# Patient Record
Sex: MALE | Race: WHITE | Employment: FULL TIME | URBAN - METROPOLITAN AREA
[De-identification: names, ages, dates, MRNs, and addresses within clinical notes are randomized per-mention and may not be internally consistent; named-entity substitution may affect disease eponyms.]

---

## 2017-01-11 ENCOUNTER — TELEPHONE (OUTPATIENT)
Dept: FAMILY MEDICINE CLINIC | Facility: CLINIC | Age: 57
End: 2017-01-11

## 2017-01-11 ENCOUNTER — MED REC SCAN ONLY (OUTPATIENT)
Dept: FAMILY MEDICINE CLINIC | Facility: CLINIC | Age: 57
End: 2017-01-11

## 2017-01-16 ENCOUNTER — CHARTING TRANS (OUTPATIENT)
Dept: ORTHOPEDICS | Age: 57
End: 2017-01-16

## 2017-01-16 ENCOUNTER — MYAURORA ACCOUNT LINK (OUTPATIENT)
Dept: OTHER | Age: 57
End: 2017-01-16

## 2017-01-18 ENCOUNTER — OFFICE VISIT (OUTPATIENT)
Dept: FAMILY MEDICINE CLINIC | Facility: CLINIC | Age: 57
End: 2017-01-18

## 2017-01-18 VITALS
TEMPERATURE: 97 F | RESPIRATION RATE: 16 BRPM | SYSTOLIC BLOOD PRESSURE: 120 MMHG | WEIGHT: 216.38 LBS | HEIGHT: 71.5 IN | BODY MASS INDEX: 29.63 KG/M2 | DIASTOLIC BLOOD PRESSURE: 82 MMHG | HEART RATE: 64 BPM

## 2017-01-18 DIAGNOSIS — F41.9 ANXIETY: ICD-10-CM

## 2017-01-18 DIAGNOSIS — Z00.00 WELL ADULT EXAM: Primary | ICD-10-CM

## 2017-01-18 DIAGNOSIS — E78.5 HYPERLIPIDEMIA, UNSPECIFIED HYPERLIPIDEMIA TYPE: ICD-10-CM

## 2017-01-18 DIAGNOSIS — E03.9 HYPOTHYROIDISM, UNSPECIFIED TYPE: ICD-10-CM

## 2017-01-18 LAB
ALBUMIN SERPL-MCNC: 4.2 G/DL (ref 3.5–4.8)
ALP LIVER SERPL-CCNC: 80 U/L (ref 45–117)
ALT SERPL-CCNC: 22 U/L (ref 17–63)
AST SERPL-CCNC: 16 U/L (ref 15–41)
BILIRUB SERPL-MCNC: 0.8 MG/DL (ref 0.1–2)
BUN BLD-MCNC: 12 MG/DL (ref 8–20)
CALCIUM BLD-MCNC: 9.1 MG/DL (ref 8.3–10.3)
CHLORIDE: 104 MMOL/L (ref 101–111)
CHOLEST SMN-MCNC: 176 MG/DL (ref ?–200)
CO2: 26 MMOL/L (ref 22–32)
COMPLEXED PSA SERPL-MCNC: 1.89 NG/ML (ref 0.01–4)
CREAT BLD-MCNC: 0.9 MG/DL (ref 0.7–1.3)
ERYTHROCYTE [DISTWIDTH] IN BLOOD BY AUTOMATED COUNT: 12.7 % (ref 11.5–16)
FREE T4: 1 NG/DL (ref 0.9–1.8)
GLUCOSE BLD-MCNC: 84 MG/DL (ref 70–99)
HCT VFR BLD AUTO: 49.5 % (ref 37–53)
HDLC SERPL-MCNC: 54 MG/DL (ref 45–?)
HDLC SERPL: 3.26 {RATIO} (ref ?–4.97)
HGB BLD-MCNC: 16.6 G/DL (ref 13–17)
LDLC SERPL CALC-MCNC: 105 MG/DL (ref ?–130)
M PROTEIN MFR SERPL ELPH: 7.2 G/DL (ref 6.1–8.3)
MCH RBC QN AUTO: 32.3 PG (ref 27–33.2)
MCHC RBC AUTO-ENTMCNC: 33.5 G/DL (ref 31–37)
MCV RBC AUTO: 96.3 FL (ref 80–99)
NONHDLC SERPL-MCNC: 122 MG/DL (ref ?–130)
PLATELET # BLD AUTO: 222 10(3)UL (ref 150–450)
POTASSIUM SERPL-SCNC: 4.2 MMOL/L (ref 3.6–5.1)
RBC # BLD AUTO: 5.14 X10(6)UL (ref 4.3–5.7)
RED CELL DISTRIBUTION WIDTH-SD: 45.1 FL (ref 35.1–46.3)
SODIUM SERPL-SCNC: 138 MMOL/L (ref 136–144)
TRIGLYCERIDES: 84 MG/DL (ref ?–150)
TSI SER-ACNC: 0.11 MIU/ML (ref 0.35–5.5)
VLDL: 17 MG/DL (ref 5–40)
WBC # BLD AUTO: 13.7 X10(3) UL (ref 4–13)

## 2017-01-18 PROCEDURE — 80050 GENERAL HEALTH PANEL: CPT | Performed by: FAMILY MEDICINE

## 2017-01-18 PROCEDURE — 80053 COMPREHEN METABOLIC PANEL: CPT | Performed by: FAMILY MEDICINE

## 2017-01-18 PROCEDURE — 84439 ASSAY OF FREE THYROXINE: CPT | Performed by: FAMILY MEDICINE

## 2017-01-18 PROCEDURE — 36415 COLL VENOUS BLD VENIPUNCTURE: CPT | Performed by: FAMILY MEDICINE

## 2017-01-18 PROCEDURE — 84443 ASSAY THYROID STIM HORMONE: CPT | Performed by: FAMILY MEDICINE

## 2017-01-18 PROCEDURE — 80061 LIPID PANEL: CPT | Performed by: FAMILY MEDICINE

## 2017-01-18 PROCEDURE — 99396 PREV VISIT EST AGE 40-64: CPT | Performed by: FAMILY MEDICINE

## 2017-01-18 PROCEDURE — 85027 COMPLETE CBC AUTOMATED: CPT | Performed by: FAMILY MEDICINE

## 2017-01-18 RX ORDER — ATORVASTATIN CALCIUM 20 MG/1
20 TABLET, FILM COATED ORAL NIGHTLY
Qty: 90 TABLET | Refills: 3 | Status: SHIPPED | OUTPATIENT
Start: 2017-01-18 | End: 2018-01-21

## 2017-01-18 RX ORDER — CITALOPRAM 20 MG/1
30 TABLET ORAL DAILY
Qty: 135 TABLET | Refills: 3 | Status: SHIPPED | OUTPATIENT
Start: 2017-01-18 | End: 2018-01-21

## 2017-01-18 NOTE — PROGRESS NOTES
Rebekah Fox is a 49year old male. CC:  Patient presents with:   Well Adult: per pt      HPI:  Yearly PX  Last lipid: 1 year ago  Last Tdap: 2009  Last PSA: 1 year  Last Colonoscopy: 2013--recommended repeat in 5 years due to hyperplastic polyps Denies dysuria, hematuria    Vitals: /82 mmHg  Pulse 64  Temp(Src) 97.3 °F (36.3 °C) (Temporal)  Resp 16  Ht 71.5\"  Wt 216 lb 6.4 oz  BMI 29.76 kg/m2   Reviewed by Louise Espinoza M.D.     Physical Exam:  GEN: well developed, well nourished, in no appare nightly. Citalopram Hydrobromide 20 MG Oral Tab 135 tablet 3      Sig: Take 1.5 tablets (30 mg total) by mouth daily. No Follow-up on file.                 Authorized by Geraldine Bonilla M.D.

## 2017-01-21 ENCOUNTER — TELEPHONE (OUTPATIENT)
Dept: FAMILY MEDICINE CLINIC | Facility: CLINIC | Age: 57
End: 2017-01-21

## 2017-01-23 PROBLEM — I45.10 RIGHT BUNDLE BRANCH BLOCK (RBBB): Status: ACTIVE | Noted: 2017-01-23

## 2017-01-23 PROBLEM — F32.A DEPRESSION: Status: ACTIVE | Noted: 2017-01-23

## 2017-02-18 ENCOUNTER — CHARTING TRANS (OUTPATIENT)
Dept: OTHER | Age: 57
End: 2017-02-18

## 2017-03-07 ENCOUNTER — NURSE ONLY (OUTPATIENT)
Dept: FAMILY MEDICINE CLINIC | Facility: CLINIC | Age: 57
End: 2017-03-07

## 2017-03-07 DIAGNOSIS — D72.829 LEUKOCYTOSIS, UNSPECIFIED TYPE: Primary | ICD-10-CM

## 2017-03-07 LAB
BASOPHILS # BLD AUTO: 0.06 X10(3) UL (ref 0–0.1)
BASOPHILS NFR BLD AUTO: 1.1 %
EOSINOPHIL # BLD AUTO: 0.08 X10(3) UL (ref 0–0.3)
EOSINOPHIL NFR BLD AUTO: 1.4 %
ERYTHROCYTE [DISTWIDTH] IN BLOOD BY AUTOMATED COUNT: 12.5 % (ref 11.5–16)
HCT VFR BLD AUTO: 46.8 % (ref 37–53)
HGB BLD-MCNC: 15.9 G/DL (ref 13–17)
IMMATURE GRANULOCYTE COUNT: 0.03 X10(3) UL (ref 0–1)
IMMATURE GRANULOCYTE RATIO %: 0.5 %
LYMPHOCYTES # BLD AUTO: 1.02 X10(3) UL (ref 0.9–4)
LYMPHOCYTES NFR BLD AUTO: 18.4 %
MCH RBC QN AUTO: 32.3 PG (ref 27–33.2)
MCHC RBC AUTO-ENTMCNC: 34 G/DL (ref 31–37)
MCV RBC AUTO: 94.9 FL (ref 80–99)
MONOCYTES # BLD AUTO: 0.98 X10(3) UL (ref 0.1–0.6)
MONOCYTES NFR BLD AUTO: 17.7 %
NEUTROPHIL ABS PRELIM: 3.37 X10 (3) UL (ref 1.3–6.7)
NEUTROPHILS # BLD AUTO: 3.37 X10(3) UL (ref 1.3–6.7)
NEUTROPHILS NFR BLD AUTO: 60.9 %
PLATELET # BLD AUTO: 164 10(3)UL (ref 150–450)
RBC # BLD AUTO: 4.93 X10(6)UL (ref 4.3–5.7)
RED CELL DISTRIBUTION WIDTH-SD: 43.5 FL (ref 35.1–46.3)
WBC # BLD AUTO: 5.5 X10(3) UL (ref 4–13)

## 2017-03-07 PROCEDURE — 85025 COMPLETE CBC W/AUTO DIFF WBC: CPT | Performed by: FAMILY MEDICINE

## 2017-03-07 PROCEDURE — 36415 COLL VENOUS BLD VENIPUNCTURE: CPT | Performed by: FAMILY MEDICINE

## 2017-04-12 ENCOUNTER — CHARTING TRANS (OUTPATIENT)
Dept: URGENT CARE | Age: 57
End: 2017-04-12

## 2017-04-12 ASSESSMENT — PAIN SCALES - GENERAL: PAINLEVEL_OUTOF10: 2

## 2017-04-19 ENCOUNTER — CHARTING TRANS (OUTPATIENT)
Dept: URGENT CARE | Age: 57
End: 2017-04-19

## 2017-04-19 ENCOUNTER — MYAURORA ACCOUNT LINK (OUTPATIENT)
Dept: OTHER | Age: 57
End: 2017-04-19

## 2017-04-19 ASSESSMENT — PAIN SCALES - GENERAL: PAINLEVEL_OUTOF10: 5

## 2017-06-07 ENCOUNTER — APPOINTMENT (OUTPATIENT)
Dept: LAB | Age: 57
End: 2017-06-07
Attending: FAMILY MEDICINE
Payer: COMMERCIAL

## 2017-06-07 ENCOUNTER — PATIENT MESSAGE (OUTPATIENT)
Dept: FAMILY MEDICINE CLINIC | Facility: CLINIC | Age: 57
End: 2017-06-07

## 2017-06-07 DIAGNOSIS — R53.83 OTHER FATIGUE: Primary | ICD-10-CM

## 2017-06-07 DIAGNOSIS — R53.83 OTHER FATIGUE: ICD-10-CM

## 2017-06-07 PROCEDURE — 36415 COLL VENOUS BLD VENIPUNCTURE: CPT | Performed by: FAMILY MEDICINE

## 2017-06-07 PROCEDURE — 84481 FREE ASSAY (FT-3): CPT | Performed by: FAMILY MEDICINE

## 2017-06-07 PROCEDURE — 84439 ASSAY OF FREE THYROXINE: CPT | Performed by: FAMILY MEDICINE

## 2017-06-07 PROCEDURE — 84443 ASSAY THYROID STIM HORMONE: CPT | Performed by: FAMILY MEDICINE

## 2017-06-07 NOTE — TELEPHONE ENCOUNTER
From: Lisa Werner  To: Aditya Corley MD  Sent: 6/7/2017 8:13 AM CDT  Subject: Non-Urgent Medical Question    I have noticed over the past couple of months my energy level feels below par. I also get very tired in the evening.  I'm checking to see if it

## 2017-07-10 ENCOUNTER — PATIENT MESSAGE (OUTPATIENT)
Dept: FAMILY MEDICINE CLINIC | Facility: CLINIC | Age: 57
End: 2017-07-10

## 2017-07-10 NOTE — TELEPHONE ENCOUNTER
From: Michelle Werner  To: Daniel Lindo MD  Sent: 7/10/2017 1:44 PM CDT  Subject: Non-Urgent Medical Question    Hello,  Crazy question for today. Robert Kowalski I have had dry and cracked lips for several months.  I've tried using several OTC products and they don't s

## 2017-07-27 ENCOUNTER — PATIENT MESSAGE (OUTPATIENT)
Dept: FAMILY MEDICINE CLINIC | Facility: CLINIC | Age: 57
End: 2017-07-27

## 2017-08-04 ENCOUNTER — PATIENT MESSAGE (OUTPATIENT)
Dept: FAMILY MEDICINE CLINIC | Facility: CLINIC | Age: 57
End: 2017-08-04

## 2017-08-04 NOTE — TELEPHONE ENCOUNTER
From: Jaime Dance Messmer  To: Jake Kate MD  Sent: 8/4/2017 1:29 PM CDT  Subject: Non-Urgent Medical Question    Doc Anuel Mcneil,  I'm over a week using the Lamisil on my lips and I'm not seeing/feeling any improvement. It's still very uncomfortable.  I'm also u

## 2017-09-27 ENCOUNTER — CHARTING TRANS (OUTPATIENT)
Dept: OTHER | Age: 57
End: 2017-09-27

## 2017-09-27 ENCOUNTER — MYAURORA ACCOUNT LINK (OUTPATIENT)
Dept: OTHER | Age: 57
End: 2017-09-27

## 2018-01-19 ENCOUNTER — HOSPITAL ENCOUNTER (OUTPATIENT)
Dept: GENERAL RADIOLOGY | Age: 58
Discharge: HOME | End: 2018-01-19
Attending: FAMILY MEDICINE
Payer: COMMERCIAL

## 2018-01-19 ENCOUNTER — OFFICE VISIT (OUTPATIENT)
Dept: FAMILY MEDICINE CLINIC | Facility: CLINIC | Age: 58
End: 2018-01-19

## 2018-01-19 ENCOUNTER — PATIENT MESSAGE (OUTPATIENT)
Dept: FAMILY MEDICINE CLINIC | Facility: CLINIC | Age: 58
End: 2018-01-19

## 2018-01-19 VITALS
SYSTOLIC BLOOD PRESSURE: 112 MMHG | WEIGHT: 223 LBS | RESPIRATION RATE: 16 BRPM | DIASTOLIC BLOOD PRESSURE: 76 MMHG | HEIGHT: 73 IN | HEART RATE: 64 BPM | TEMPERATURE: 97 F | BODY MASS INDEX: 29.55 KG/M2

## 2018-01-19 DIAGNOSIS — M25.50 MULTIPLE JOINT PAIN: ICD-10-CM

## 2018-01-19 DIAGNOSIS — F41.9 ANXIETY: ICD-10-CM

## 2018-01-19 DIAGNOSIS — E78.5 HYPERLIPIDEMIA, UNSPECIFIED HYPERLIPIDEMIA TYPE: ICD-10-CM

## 2018-01-19 DIAGNOSIS — R20.2 PARESTHESIA OF BOTH HANDS: ICD-10-CM

## 2018-01-19 DIAGNOSIS — E03.9 HYPOTHYROIDISM, UNSPECIFIED TYPE: ICD-10-CM

## 2018-01-19 DIAGNOSIS — Z00.00 WELL ADULT EXAM: Primary | ICD-10-CM

## 2018-01-19 LAB
25-HYDROXYVITAMIN D (TOTAL): 28.5 NG/ML (ref 30–100)
ALBUMIN SERPL-MCNC: 4.1 G/DL (ref 3.5–4.8)
ALP LIVER SERPL-CCNC: 82 U/L (ref 45–117)
ALT SERPL-CCNC: 39 U/L (ref 17–63)
AST SERPL-CCNC: 21 U/L (ref 15–41)
BILIRUB SERPL-MCNC: 0.9 MG/DL (ref 0.1–2)
BUN BLD-MCNC: 14 MG/DL (ref 8–20)
CALCIUM BLD-MCNC: 9.1 MG/DL (ref 8.3–10.3)
CHLORIDE: 106 MMOL/L (ref 101–111)
CHOLEST SMN-MCNC: 148 MG/DL (ref ?–200)
CO2: 30 MMOL/L (ref 22–32)
COMPLEXED PSA SERPL-MCNC: 1.58 NG/ML (ref 0.01–4)
CREAT BLD-MCNC: 0.89 MG/DL (ref 0.7–1.3)
ERYTHROCYTE [DISTWIDTH] IN BLOOD BY AUTOMATED COUNT: 12.5 % (ref 11.5–16)
FREE T4: 1 NG/DL (ref 0.9–1.8)
GLUCOSE BLD-MCNC: 73 MG/DL (ref 70–99)
HCT VFR BLD AUTO: 46.8 % (ref 37–53)
HDLC SERPL-MCNC: 44 MG/DL (ref 45–?)
HDLC SERPL: 3.36 {RATIO} (ref ?–4.97)
HGB BLD-MCNC: 16.1 G/DL (ref 13–17)
LDLC SERPL CALC-MCNC: 84 MG/DL (ref ?–130)
M PROTEIN MFR SERPL ELPH: 7.2 G/DL (ref 6.1–8.3)
MCH RBC QN AUTO: 31.4 PG (ref 27–33.2)
MCHC RBC AUTO-ENTMCNC: 34.4 G/DL (ref 31–37)
MCV RBC AUTO: 91.4 FL (ref 80–99)
NONHDLC SERPL-MCNC: 104 MG/DL (ref ?–130)
PLATELET # BLD AUTO: 231 10(3)UL (ref 150–450)
POTASSIUM SERPL-SCNC: 4.6 MMOL/L (ref 3.6–5.1)
RBC # BLD AUTO: 5.12 X10(6)UL (ref 4.3–5.7)
RED CELL DISTRIBUTION WIDTH-SD: 42 FL (ref 35.1–46.3)
RHEUMATOID FACT SERPL-ACNC: 11 IU/ML (ref ?–15)
SODIUM SERPL-SCNC: 139 MMOL/L (ref 136–144)
TRIGL SERPL-MCNC: 98 MG/DL (ref ?–150)
TSI SER-ACNC: 0.41 MIU/ML (ref 0.35–5.5)
VLDLC SERPL CALC-MCNC: 20 MG/DL (ref 5–40)
WBC # BLD AUTO: 6.5 X10(3) UL (ref 4–13)

## 2018-01-19 PROCEDURE — 84153 ASSAY OF PSA TOTAL: CPT | Performed by: FAMILY MEDICINE

## 2018-01-19 PROCEDURE — 99396 PREV VISIT EST AGE 40-64: CPT | Performed by: FAMILY MEDICINE

## 2018-01-19 PROCEDURE — 86431 RHEUMATOID FACTOR QUANT: CPT | Performed by: FAMILY MEDICINE

## 2018-01-19 PROCEDURE — 86225 DNA ANTIBODY NATIVE: CPT | Performed by: FAMILY MEDICINE

## 2018-01-19 PROCEDURE — 72050 X-RAY EXAM NECK SPINE 4/5VWS: CPT | Performed by: FAMILY MEDICINE

## 2018-01-19 PROCEDURE — 82306 VITAMIN D 25 HYDROXY: CPT | Performed by: FAMILY MEDICINE

## 2018-01-19 PROCEDURE — 86038 ANTINUCLEAR ANTIBODIES: CPT | Performed by: FAMILY MEDICINE

## 2018-01-19 PROCEDURE — 36415 COLL VENOUS BLD VENIPUNCTURE: CPT | Performed by: FAMILY MEDICINE

## 2018-01-19 PROCEDURE — 86235 NUCLEAR ANTIGEN ANTIBODY: CPT | Performed by: FAMILY MEDICINE

## 2018-01-19 PROCEDURE — 80061 LIPID PANEL: CPT | Performed by: FAMILY MEDICINE

## 2018-01-19 PROCEDURE — 80050 GENERAL HEALTH PANEL: CPT | Performed by: FAMILY MEDICINE

## 2018-01-19 PROCEDURE — 84439 ASSAY OF FREE THYROXINE: CPT | Performed by: FAMILY MEDICINE

## 2018-01-19 NOTE — PROGRESS NOTES
Dahlia Yepez is a 56year old male.     CC:  Patient presents with:  Physical: per pt      HPI:  Yearly PX  Last lipid: 1 year ago  Last Tdap: 2009  Last PSA: 1 year  Last Colonoscopy: 2013--recommended repeat in 5 years due to hyperplastic polyps     H Noted on EKG from [facility] note dated 4/27/04      Past Surgical History:  No date: OTHER SURGICAL HISTORY      Comment: Retinal detatchment  No date: VASECTOMY      Comment: From [facility] records   Family History   Problem Relation Age of Onset   • CAD [Other Future  - LIPID PANEL; Future  - VITAMIN D, 25-HYDROXY; Future  - PSA SCREEN; Future    2. Hyperlipidemia, unspecified hyperlipidemia type  Await results     - COMP METABOLIC PANEL (14); Future  - LIPID PANEL; Future    3.  Hypothyroidism, unspecified type

## 2018-01-20 NOTE — TELEPHONE ENCOUNTER
LOV  01/19/2018  Last refill   Levothyroxine: 02/17/2017  #90 w. 3RF  Atorvastatin 01/18/2017 #90 w. 3RF  Citalopram 01/18/2017 #90 w. 3  No future appointments. Please advise.

## 2018-01-20 NOTE — TELEPHONE ENCOUNTER
From: León Werner  To: Brian Schmitz MD  Sent: 1/19/2018 6:31 PM CST  Subject: Other    Please note that my maintenance drug scripts should be sent to [place] [id number]. Please do not send these to [facility] due to insurance purposes.   Let me know if you ha

## 2018-01-21 RX ORDER — LEVOTHYROXINE SODIUM 0.1 MG/1
100 TABLET ORAL DAILY
Qty: 90 TABLET | Refills: 3 | Status: SHIPPED | OUTPATIENT
Start: 2018-01-21 | End: 2019-01-21

## 2018-01-21 RX ORDER — CITALOPRAM 20 MG/1
30 TABLET ORAL DAILY
Qty: 135 TABLET | Refills: 3 | Status: SHIPPED | OUTPATIENT
Start: 2018-01-21 | End: 2018-01-29 | Stop reason: DRUGHIGH

## 2018-01-21 RX ORDER — ATORVASTATIN CALCIUM 20 MG/1
20 TABLET, FILM COATED ORAL NIGHTLY
Qty: 90 TABLET | Refills: 3 | Status: SHIPPED | OUTPATIENT
Start: 2018-01-21 | End: 2019-05-27

## 2018-01-24 LAB
ANA SCREEN: POSITIVE
CENTROMERE AUTOAB: <100 AU/ML (ref ?–100)
DSDNA AUTOAB: 121 IU/ML (ref ?–100)
HISTONE AUTOAB: <100 AU/ML (ref ?–100)
JO-1 AUTOAB: <100 AU/ML (ref ?–100)
RNP AUTOAB: 175 AU/ML (ref ?–100)
SCL-70 AUTOAB: 158 AU/ML (ref ?–100)
SM AUTOAB (SMITH): <100 AU/ML (ref ?–100)
SSA AUTOAB: 141 AU/ML (ref ?–100)
SSB AUTOAB: <100 AU/ML (ref ?–100)

## 2018-01-25 PROBLEM — R76.8 POSITIVE ANA (ANTINUCLEAR ANTIBODY): Status: ACTIVE | Noted: 2018-01-25

## 2018-01-26 ENCOUNTER — MYAURORA ACCOUNT LINK (OUTPATIENT)
Dept: OTHER | Age: 58
End: 2018-01-26

## 2018-02-09 ENCOUNTER — PATIENT MESSAGE (OUTPATIENT)
Dept: FAMILY MEDICINE CLINIC | Facility: CLINIC | Age: 58
End: 2018-02-09

## 2018-02-10 NOTE — TELEPHONE ENCOUNTER
From: Jaime Dance Messmer  To: Jake Kate MD  Sent: 2/9/2018 12:17 PM CST  Subject: Test Results Question    Can you please email me or mail me the lab results from my BENITA Lupas test?  I need to bring these with me to the appointment. Thank you.

## 2018-02-22 RX ORDER — ATORVASTATIN CALCIUM 20 MG/1
20 TABLET, FILM COATED ORAL NIGHTLY
Qty: 90 TABLET | Refills: 3 | Status: SHIPPED | OUTPATIENT
Start: 2018-02-22 | End: 2018-08-31

## 2018-02-22 NOTE — TELEPHONE ENCOUNTER
Last refill on Atorvastatin #90 with 3 refills on 1 21 2018   Last Lipid Panel on 1 19 2018   Last OV on 1 19 2018

## 2018-03-14 ENCOUNTER — IMAGING SERVICES (OUTPATIENT)
Dept: OTHER | Age: 58
End: 2018-03-14

## 2018-03-20 ENCOUNTER — CHARTING TRANS (OUTPATIENT)
Dept: OTHER | Age: 58
End: 2018-03-20

## 2018-04-16 PROCEDURE — 86225 DNA ANTIBODY NATIVE: CPT | Performed by: INTERNAL MEDICINE

## 2018-04-16 PROCEDURE — 84165 PROTEIN E-PHORESIS SERUM: CPT | Performed by: INTERNAL MEDICINE

## 2018-04-16 PROCEDURE — 83883 ASSAY NEPHELOMETRY NOT SPEC: CPT | Performed by: INTERNAL MEDICINE

## 2018-04-16 PROCEDURE — 86334 IMMUNOFIX E-PHORESIS SERUM: CPT | Performed by: INTERNAL MEDICINE

## 2018-04-16 PROCEDURE — 86160 COMPLEMENT ANTIGEN: CPT | Performed by: INTERNAL MEDICINE

## 2018-04-16 PROCEDURE — 86200 CCP ANTIBODY: CPT | Performed by: INTERNAL MEDICINE

## 2018-04-16 PROCEDURE — 86038 ANTINUCLEAR ANTIBODIES: CPT | Performed by: INTERNAL MEDICINE

## 2018-04-16 PROCEDURE — 83516 IMMUNOASSAY NONANTIBODY: CPT | Performed by: INTERNAL MEDICINE

## 2018-05-04 ENCOUNTER — LAB ENCOUNTER (OUTPATIENT)
Dept: LAB | Age: 58
End: 2018-05-04
Attending: INTERNAL MEDICINE
Payer: COMMERCIAL

## 2018-05-04 DIAGNOSIS — R76.8 POSITIVE ANA (ANTINUCLEAR ANTIBODY): ICD-10-CM

## 2018-05-04 DIAGNOSIS — M25.60 MORNING STIFFNESS OF JOINTS: ICD-10-CM

## 2018-05-04 DIAGNOSIS — M25.50 POLYARTHRALGIA: ICD-10-CM

## 2018-05-04 DIAGNOSIS — R68.2 DRY MOUTH: ICD-10-CM

## 2018-05-04 PROCEDURE — 86812 HLA TYPING A B OR C: CPT

## 2018-05-04 PROCEDURE — 36415 COLL VENOUS BLD VENIPUNCTURE: CPT

## 2018-05-07 NOTE — PROGRESS NOTES
Call pt  [id number] that sometimes can be seen in psoriatic arthritis is negative  I would still advise US of hands as next step  Order is placed

## 2018-05-16 ENCOUNTER — CHARTING TRANS (OUTPATIENT)
Dept: OTHER | Age: 58
End: 2018-05-16

## 2018-08-31 PROCEDURE — 80074 ACUTE HEPATITIS PANEL: CPT | Performed by: INTERNAL MEDICINE

## 2018-08-31 PROCEDURE — 86480 TB TEST CELL IMMUN MEASURE: CPT | Performed by: INTERNAL MEDICINE

## 2018-08-31 PROCEDURE — 86235 NUCLEAR ANTIGEN ANTIBODY: CPT | Performed by: INTERNAL MEDICINE

## 2018-08-31 PROCEDURE — 86160 COMPLEMENT ANTIGEN: CPT | Performed by: INTERNAL MEDICINE

## 2018-08-31 PROCEDURE — 86038 ANTINUCLEAR ANTIBODIES: CPT | Performed by: INTERNAL MEDICINE

## 2018-09-17 ENCOUNTER — CHARTING TRANS (OUTPATIENT)
Dept: OTHER | Age: 58
End: 2018-09-17

## 2018-10-29 PROCEDURE — 81003 URINALYSIS AUTO W/O SCOPE: CPT | Performed by: INTERNAL MEDICINE

## 2018-11-28 ENCOUNTER — CHARTING TRANS (OUTPATIENT)
Dept: OTHER | Age: 58
End: 2018-11-28

## 2018-11-28 VITALS
SYSTOLIC BLOOD PRESSURE: 123 MMHG | RESPIRATION RATE: 16 BRPM | HEART RATE: 64 BPM | TEMPERATURE: 97.8 F | DIASTOLIC BLOOD PRESSURE: 73 MMHG | OXYGEN SATURATION: 98 %

## 2018-11-28 VITALS
DIASTOLIC BLOOD PRESSURE: 66 MMHG | OXYGEN SATURATION: 98 % | HEART RATE: 70 BPM | RESPIRATION RATE: 18 BRPM | SYSTOLIC BLOOD PRESSURE: 108 MMHG | TEMPERATURE: 97.8 F

## 2018-11-29 ENCOUNTER — MYAURORA ACCOUNT LINK (OUTPATIENT)
Dept: OTHER | Age: 58
End: 2018-11-29

## 2018-11-29 ENCOUNTER — CHARTING TRANS (OUTPATIENT)
Dept: OTHER | Age: 58
End: 2018-11-29

## 2018-11-29 VITALS — HEIGHT: 73 IN | BODY MASS INDEX: 27.7 KG/M2 | WEIGHT: 209 LBS

## 2018-12-03 LAB — AP REPORT: NORMAL

## 2018-12-20 PROCEDURE — 81003 URINALYSIS AUTO W/O SCOPE: CPT | Performed by: INTERNAL MEDICINE

## 2018-12-20 PROCEDURE — 86160 COMPLEMENT ANTIGEN: CPT | Performed by: INTERNAL MEDICINE

## 2019-01-01 ENCOUNTER — EXTERNAL RECORD (OUTPATIENT)
Dept: HEALTH INFORMATION MANAGEMENT | Facility: OTHER | Age: 59
End: 2019-01-01

## 2019-01-02 ENCOUNTER — E-ADVICE (OUTPATIENT)
Dept: GASTROENTEROLOGY | Age: 59
End: 2019-01-02

## 2019-01-08 ENCOUNTER — TELEPHONE (OUTPATIENT)
Dept: FAMILY MEDICINE CLINIC | Facility: CLINIC | Age: 59
End: 2019-01-08

## 2019-01-21 RX ORDER — LEVOTHYROXINE SODIUM 0.1 MG/1
100 TABLET ORAL DAILY
Qty: 90 TABLET | Refills: 1 | Status: SHIPPED | OUTPATIENT
Start: 2019-01-21 | End: 2019-01-24 | Stop reason: DRUGHIGH

## 2019-01-21 RX ORDER — CITALOPRAM 40 MG/1
40 TABLET ORAL DAILY
Qty: 90 TABLET | Refills: 1 | Status: SHIPPED | OUTPATIENT
Start: 2019-01-21 | End: 2019-01-23 | Stop reason: ALTCHOICE

## 2019-01-21 NOTE — TELEPHONE ENCOUNTER
Levothyroxine last refilled on 1/21/18 for # 90 with 3 rf. Citalopram last refilled on 1/29/18 for # 90 with 3 rf. Last labs 1/19/18. Last seen on 1/19/18.      Future Appointments   Date Time Provider Pierre Dukes   1/23/2019  8:00 AM Juju Hale

## 2019-01-23 ENCOUNTER — OFFICE VISIT (OUTPATIENT)
Dept: FAMILY MEDICINE CLINIC | Facility: CLINIC | Age: 59
End: 2019-01-23
Payer: COMMERCIAL

## 2019-01-23 VITALS
HEIGHT: 71.5 IN | WEIGHT: 226.19 LBS | SYSTOLIC BLOOD PRESSURE: 100 MMHG | TEMPERATURE: 97 F | DIASTOLIC BLOOD PRESSURE: 74 MMHG | HEART RATE: 66 BPM | RESPIRATION RATE: 14 BRPM | BODY MASS INDEX: 30.97 KG/M2

## 2019-01-23 DIAGNOSIS — M19.90 INFLAMMATORY ARTHRITIS: ICD-10-CM

## 2019-01-23 DIAGNOSIS — Z00.00 WELL ADULT EXAM: Primary | ICD-10-CM

## 2019-01-23 DIAGNOSIS — E78.5 HYPERLIPIDEMIA, UNSPECIFIED HYPERLIPIDEMIA TYPE: ICD-10-CM

## 2019-01-23 DIAGNOSIS — F41.9 ANXIETY: ICD-10-CM

## 2019-01-23 DIAGNOSIS — R35.0 URINARY FREQUENCY: ICD-10-CM

## 2019-01-23 DIAGNOSIS — E55.9 VITAMIN D DEFICIENCY: ICD-10-CM

## 2019-01-23 DIAGNOSIS — E03.9 HYPOTHYROIDISM, UNSPECIFIED TYPE: ICD-10-CM

## 2019-01-23 LAB
CHOLEST SMN-MCNC: 164 MG/DL (ref ?–200)
COMPLEXED PSA SERPL-MCNC: 2.04 NG/ML (ref 0.01–4)
HDLC SERPL-MCNC: 44 MG/DL (ref 40–59)
LDLC SERPL CALC-MCNC: 101 MG/DL (ref ?–100)
NONHDLC SERPL-MCNC: 120 MG/DL (ref ?–130)
T3FREE SERPL-MCNC: 2.35 PG/ML (ref 2.3–4.2)
T4 FREE SERPL-MCNC: 0.8 NG/DL (ref 0.9–1.8)
TRIGL SERPL-MCNC: 93 MG/DL (ref 30–149)
TSI SER-ACNC: 14.3 MIU/ML (ref 0.35–5.5)
VIT D+METAB SERPL-MCNC: 49.3 NG/ML (ref 30–100)
VLDLC SERPL CALC-MCNC: 19 MG/DL (ref 0–30)

## 2019-01-23 PROCEDURE — 99396 PREV VISIT EST AGE 40-64: CPT | Performed by: FAMILY MEDICINE

## 2019-01-23 PROCEDURE — 84443 ASSAY THYROID STIM HORMONE: CPT | Performed by: FAMILY MEDICINE

## 2019-01-23 PROCEDURE — 84481 FREE ASSAY (FT-3): CPT | Performed by: FAMILY MEDICINE

## 2019-01-23 PROCEDURE — 80061 LIPID PANEL: CPT | Performed by: FAMILY MEDICINE

## 2019-01-23 PROCEDURE — 84439 ASSAY OF FREE THYROXINE: CPT | Performed by: FAMILY MEDICINE

## 2019-01-23 PROCEDURE — 84153 ASSAY OF PSA TOTAL: CPT | Performed by: FAMILY MEDICINE

## 2019-01-23 PROCEDURE — 82306 VITAMIN D 25 HYDROXY: CPT | Performed by: FAMILY MEDICINE

## 2019-01-23 PROCEDURE — 90715 TDAP VACCINE 7 YRS/> IM: CPT | Performed by: FAMILY MEDICINE

## 2019-01-23 PROCEDURE — 36415 COLL VENOUS BLD VENIPUNCTURE: CPT | Performed by: FAMILY MEDICINE

## 2019-01-23 PROCEDURE — 90471 IMMUNIZATION ADMIN: CPT | Performed by: FAMILY MEDICINE

## 2019-01-23 RX ORDER — SULFASALAZINE 500 MG/1
TABLET, DELAYED RELEASE ORAL
COMMUNITY
Start: 2019-01-23 | End: 2019-02-19

## 2019-01-23 RX ORDER — CITALOPRAM 40 MG/1
40 TABLET ORAL DAILY
Qty: 90 TABLET | Refills: 1 | COMMUNITY
Start: 2019-01-23 | End: 2019-01-23

## 2019-01-23 RX ORDER — DULOXETIN HYDROCHLORIDE 30 MG/1
30 CAPSULE, DELAYED RELEASE ORAL DAILY
Qty: 90 CAPSULE | Refills: 0 | Status: SHIPPED | OUTPATIENT
Start: 2019-01-23 | End: 2019-01-23

## 2019-01-23 NOTE — PROGRESS NOTES
Dahlia Yepez is a 66year old male.     CC:  Patient presents with:  Physical      HPI:  Yearly PX  Last lipid:  Component      Latest Ref Rng & Units 1/19/2018   CHOLESTEROL, TOTAL      <200 mg/dL 148   Triglycerides      <150 mg/dL 98   HDL Cholestero LEVOTHYROXINE SODIUM 100 MCG Oral Tab TAKE 1 TABLET (100 MCG TOTAL) BY MOUTH DAILY. Disp: 90 tablet Rfl: 1   atorvastatin 20 MG Oral Tab Take 1 tablet (20 mg total) by mouth nightly.  Disp: 90 tablet Rfl: 3   Halobetasol Propionate 0.05 % External Ointmen apparent distress  EYE: B conjunctiva and lids normal  HENT: normocephalic; normal nose, pharynx and TM's  NECK: No lymphadenopathy, thyromegaly or masses  CAR: S1, S2 normal, RRR; no S3, no S4; no click; murmur negative  PULM: clear to auscultation B, no This Encounter      Lipid Panel [E]      TSH and Free T4 [E]      Triiodothyronine,Free,Serum [E]      PSA (Screening) [E]      Vitamin D, 25-Hydroxy [E]      *Venipuncture      Orders Placed This Encounter      Lipid Panel [E]          Standing Status:  Fu

## 2019-01-24 ENCOUNTER — TELEPHONE (OUTPATIENT)
Dept: FAMILY MEDICINE CLINIC | Facility: CLINIC | Age: 59
End: 2019-01-24

## 2019-01-24 RX ORDER — LEVOTHYROXINE SODIUM 0.12 MG/1
125 TABLET ORAL
Qty: 90 TABLET | Refills: 0 | Status: SHIPPED | OUTPATIENT
Start: 2019-01-24 | End: 2019-04-18

## 2019-01-24 NOTE — TELEPHONE ENCOUNTER
Patient advised of the blood work results and recommendations per Dr. Yvrose Garber. Patient states that he takes his thyroid medication between 3 and 6 am. Does not take his other medication with the thyroid medication.    Patient is going to increase his synt

## 2019-01-24 NOTE — TELEPHONE ENCOUNTER
----- Message from Aubrey Guo MD sent at 1/23/2019  6:00 PM CST -----  Please let patient know that the Vit D, sugar, electrolyte, liver, kidney, and prostate, tests were fine. There is no anemia and the white blood cell count is fine.  Lipids are fine

## 2019-01-24 NOTE — TELEPHONE ENCOUNTER
----- Message from Michelle Jack MD sent at 1/23/2019  6:00 PM CST -----  Please let patient know that the Vit D, sugar, electrolyte, liver, kidney, and prostate, tests were fine. There is no anemia and the white blood cell count is fine.  Lipids are fine

## 2019-02-14 PROCEDURE — 81003 URINALYSIS AUTO W/O SCOPE: CPT | Performed by: INTERNAL MEDICINE

## 2019-03-06 VITALS
HEIGHT: 73 IN | DIASTOLIC BLOOD PRESSURE: 82 MMHG | SYSTOLIC BLOOD PRESSURE: 114 MMHG | WEIGHT: 225 LBS | HEART RATE: 64 BPM | BODY MASS INDEX: 29.82 KG/M2

## 2019-03-13 ENCOUNTER — OFFICE VISIT (OUTPATIENT)
Dept: FAMILY MEDICINE CLINIC | Facility: CLINIC | Age: 59
End: 2019-03-13
Payer: COMMERCIAL

## 2019-03-13 VITALS
SYSTOLIC BLOOD PRESSURE: 120 MMHG | TEMPERATURE: 97 F | BODY MASS INDEX: 31 KG/M2 | WEIGHT: 222 LBS | OXYGEN SATURATION: 98 % | HEART RATE: 78 BPM | DIASTOLIC BLOOD PRESSURE: 78 MMHG

## 2019-03-13 DIAGNOSIS — H93.11 TINNITUS OF RIGHT EAR: ICD-10-CM

## 2019-03-13 DIAGNOSIS — E03.9 HYPOTHYROIDISM, UNSPECIFIED TYPE: Primary | ICD-10-CM

## 2019-03-13 LAB
T4 FREE SERPL-MCNC: 1 NG/DL (ref 0.8–1.7)
TSI SER-ACNC: 0.44 MIU/ML (ref 0.36–3.74)

## 2019-03-13 PROCEDURE — 36415 COLL VENOUS BLD VENIPUNCTURE: CPT | Performed by: FAMILY MEDICINE

## 2019-03-13 PROCEDURE — 99214 OFFICE O/P EST MOD 30 MIN: CPT | Performed by: FAMILY MEDICINE

## 2019-03-13 PROCEDURE — 84439 ASSAY OF FREE THYROXINE: CPT | Performed by: FAMILY MEDICINE

## 2019-03-13 PROCEDURE — 84443 ASSAY THYROID STIM HORMONE: CPT | Performed by: FAMILY MEDICINE

## 2019-03-13 NOTE — PROGRESS NOTES
Kelsea Phillip is a 66year old male.     CC:  Patient presents with:  Medication Follow-Up: per pt      HPI:  Follow up hypothyroid:  Energy: good  Skin changes: no  Palpitations: yes, a rare palpitation with no associated symptoms  Weight changes:  yes, dated 4/27/04      Past Surgical History:   Procedure Laterality Date   • OTHER SURGICAL HISTORY      Retinal detatchment   • VASECTOMY      From [facility] records      Family History   Problem Relation Age of Onset   • Other (CAD [Other]) Father       [facility]   Tel: [phone]       Orders for this visit:  Orders Placed This Encounter      TSH and Free T4 [E]      Orders Placed This Encounter      TSH and Free T4 [E]          Standing Status: Future          Number of Occurrences: 1          Standi

## 2019-03-21 PROCEDURE — 81003 URINALYSIS AUTO W/O SCOPE: CPT | Performed by: INTERNAL MEDICINE

## 2019-04-18 RX ORDER — LEVOTHYROXINE SODIUM 0.12 MG/1
125 TABLET ORAL
Qty: 90 TABLET | Refills: 1 | Status: SHIPPED | OUTPATIENT
Start: 2019-04-18 | End: 2019-05-04 | Stop reason: DRUGHIGH

## 2019-04-18 NOTE — TELEPHONE ENCOUNTER
Last refill on Levothyroxine #90 with 0 refills on 1 24 2019   Last OV on 3 13 2019   Last TSH on 3 13 2019  TSH- 0.436  Free T4- 1.0

## 2019-05-03 PROCEDURE — 36415 COLL VENOUS BLD VENIPUNCTURE: CPT | Performed by: FAMILY MEDICINE

## 2019-05-03 PROCEDURE — 84439 ASSAY OF FREE THYROXINE: CPT | Performed by: FAMILY MEDICINE

## 2019-05-03 PROCEDURE — 84443 ASSAY THYROID STIM HORMONE: CPT | Performed by: FAMILY MEDICINE

## 2019-05-03 NOTE — PROGRESS NOTES
Brandon Rodriguez is a 64year old male. CC:  Patient presents with:  Medication Follow-Up: per pt      HPI:  Increased anxiety due issues pertaining to arthritis and some life stresses. He is not sleeping well and worries more.     He is due for repeat t From [facility] records   • Right bundle branch block (RBBB) 1/23/2017    Noted on EKG from [facility] note dated 4/27/04      Past Surgical History:   Procedure Laterality Date   • OTHER SURGICAL HISTORY      Retinal detatchment   • VASECTOMY      From [facility] Future    4. Tinnitus aurium, right  Await results   - MRI IACS (W+WO) (CPT=70713); Future    5. Vertigo  Await results   - MRI IACS (W+WO) (CPT=70713);  Future      Orders for this visit:  Orders Placed This Encounter      TSH and Free T4 [E]      Orders P

## 2019-05-08 ENCOUNTER — TELEPHONE (OUTPATIENT)
Dept: FAMILY MEDICINE CLINIC | Facility: CLINIC | Age: 59
End: 2019-05-08

## 2019-05-08 NOTE — TELEPHONE ENCOUNTER
Patient advised that the MRI has been approved.  Patient advised of the phone number to call and schedule the exam.

## 2019-05-28 RX ORDER — ATORVASTATIN CALCIUM 20 MG/1
20 TABLET, FILM COATED ORAL NIGHTLY
Qty: 90 TABLET | Refills: 1 | Status: SHIPPED | OUTPATIENT
Start: 2019-05-28 | End: 2019-11-17

## 2019-06-03 ENCOUNTER — TELEPHONE (OUTPATIENT)
Dept: FAMILY MEDICINE CLINIC | Facility: CLINIC | Age: 59
End: 2019-06-03

## 2019-06-03 PROCEDURE — 36415 COLL VENOUS BLD VENIPUNCTURE: CPT | Performed by: FAMILY MEDICINE

## 2019-06-03 PROCEDURE — 84443 ASSAY THYROID STIM HORMONE: CPT | Performed by: FAMILY MEDICINE

## 2019-06-03 PROCEDURE — 84481 FREE ASSAY (FT-3): CPT | Performed by: FAMILY MEDICINE

## 2019-06-03 PROCEDURE — 84153 ASSAY OF PSA TOTAL: CPT | Performed by: FAMILY MEDICINE

## 2019-06-03 PROCEDURE — 84439 ASSAY OF FREE THYROXINE: CPT | Performed by: FAMILY MEDICINE

## 2019-06-03 PROCEDURE — 81003 URINALYSIS AUTO W/O SCOPE: CPT | Performed by: FAMILY MEDICINE

## 2019-06-03 NOTE — PROGRESS NOTES
Mauricio Petit is a 64year old male. CC:  Patient presents with:  Medication Follow-Up      HPI:  F/u Cymbalta use for anxiety. We have gently increased him to 60 mg since our last OV.  He notes some improvement of anxiety and some decrease in the RA SURGICAL HISTORY      Retinal detatchment   • VASECTOMY      From [facility] records      Family History   Problem Relation Age of Onset   • Other (CAD [Other]) Father       Family Status   Relation Status   • Mo Alive   • Fa Alive      Social History    Tobac (Screening) [E]      Urinalysis, Routine [E]      *Venipuncture      Orders Placed This Encounter      TSH and Free T4 [E]          Standing Status: Future          Number of Occurrences: 1          Standing Expiration Date: 6/3/2020      Triiodothyronine,

## 2019-06-20 RX ORDER — DULOXETIN HYDROCHLORIDE 60 MG/1
CAPSULE, DELAYED RELEASE ORAL
Qty: 90 CAPSULE | Refills: 2 | Status: SHIPPED | OUTPATIENT
Start: 2019-06-20 | End: 2020-01-27

## 2019-07-09 PROCEDURE — 81003 URINALYSIS AUTO W/O SCOPE: CPT | Performed by: INTERNAL MEDICINE

## 2019-07-09 PROCEDURE — 86160 COMPLEMENT ANTIGEN: CPT | Performed by: INTERNAL MEDICINE

## 2019-07-27 NOTE — TELEPHONE ENCOUNTER
Last refilled on 5/4/19 for # 90 with 0 rf. Last labs 6/3/19. Last seen on 6/3/19.      Future Appointments   Date Time Provider Pierre Roseline   9/18/2019  8:00 AM Chrystal Shelton MD [address]   11/11/2019  9:20 AM Shoaib Castro DO [facility]

## 2019-07-28 RX ORDER — LEVOTHYROXINE SODIUM 0.15 MG/1
150 TABLET ORAL
Qty: 90 TABLET | Refills: 1 | Status: SHIPPED | OUTPATIENT
Start: 2019-07-28 | End: 2020-02-05

## 2019-11-18 RX ORDER — ATORVASTATIN CALCIUM 20 MG/1
TABLET, FILM COATED ORAL
Qty: 90 TABLET | Refills: 0 | Status: SHIPPED | OUTPATIENT
Start: 2019-11-18 | End: 2020-01-27

## 2019-11-18 NOTE — TELEPHONE ENCOUNTER
Medication Quantity Refills Start End   ATORVASTATIN 20 MG Oral Tab  90 tablet  1 5/28/2019      Last OV was 6/3/19 for anxiety. Last lipid panel was 1/23/19. Total cholesterol was 164. LDL was 101.      Future Appointments   Date Time Provider Department

## 2019-12-19 ENCOUNTER — TELEPHONE (OUTPATIENT)
Dept: FAMILY MEDICINE CLINIC | Facility: CLINIC | Age: 59
End: 2019-12-19

## 2019-12-19 NOTE — TELEPHONE ENCOUNTER
Patient would like to wait until January to do labs. States has a physical scheduled.    Future Appointments   Date Time Provider Pierre Dukes   12/21/2019  8:15 AM [facility]UTHIE NURSE CIERA Giordano   1/6/2020  8:00 AM JIM ULTRASOUND 1 LILXRAY

## 2019-12-19 NOTE — TELEPHONE ENCOUNTER
Per 6/3/19 result note: \"F/u with me in 6 months for thyroid recheck\"    Has nurse visit scheduled for labs 12/21/19 but no orders in epic.   Has f/u with TJ scheduled 1/27/20    Please advise on lab orders

## 2020-01-03 ENCOUNTER — TELEPHONE (OUTPATIENT)
Dept: FAMILY MEDICINE CLINIC | Facility: CLINIC | Age: 60
End: 2020-01-03

## 2020-01-03 NOTE — TELEPHONE ENCOUNTER
Sandra from [facility] called, needs to verify medication pt is taking, height and weight, has he had a flu vaccine for this flu season.   Please call Sandra at [phone]

## 2020-01-06 NOTE — TELEPHONE ENCOUNTER
Patient sent a my-chart back - asking for the contact information.  He will reach out to Tabatha nance at [facility]

## 2020-01-24 NOTE — PROGRESS NOTES
Luisa Fierro is a 64year old male.     CC:  Well adult    HPI:  Yearly PX    Last lipid:  Lab Results   Component Value Date    CHOLEST 164 01/23/2019    TRIG 93 01/23/2019    HDL 44 01/23/2019     (H) 01/23/2019    VLDL 19 01/23/2019    TCHDLRA (L) 06/03/2019     Allergies:    Augmentin [Amoxicil*    RASH  Etodolac                DIZZINESS, NAUSEA ONLY    Comment:Hearing loss, lightheadedness  Simvastatin             MYALGIA   Current Meds:  Current Outpatient Medications   Medication Sig Dispens fine  Cardiovascular/Pulses: Denies palpitations, tachycardia, irregular heart beat, chest pain, exertional chest pain or pressure, paroxysmal nocturnal dyspnea, orthopnea  Respiratory: Denies dyspnea, dyspnea on exertion  GI: Denies hematochezia, melena type  Await results     - LIPID PANEL    6. Nocturia  . conurol   - UROLOGY - INTERNAL    7.  Urinary urgency  Consult Urology   - UROLOGY - INTERNAL      Orders for this visit:    Orders Placed This Encounter      CBC, Platelet, No Differential [E]

## 2020-01-27 ENCOUNTER — OFFICE VISIT (OUTPATIENT)
Dept: FAMILY MEDICINE CLINIC | Facility: CLINIC | Age: 60
End: 2020-01-27
Payer: COMMERCIAL

## 2020-01-27 VITALS
DIASTOLIC BLOOD PRESSURE: 80 MMHG | TEMPERATURE: 98 F | RESPIRATION RATE: 16 BRPM | BODY MASS INDEX: 31 KG/M2 | HEART RATE: 68 BPM | WEIGHT: 224 LBS | SYSTOLIC BLOOD PRESSURE: 122 MMHG

## 2020-01-27 DIAGNOSIS — F41.9 ANXIETY: ICD-10-CM

## 2020-01-27 DIAGNOSIS — Z00.00 WELL ADULT EXAM: Primary | ICD-10-CM

## 2020-01-27 DIAGNOSIS — R35.1 NOCTURIA: ICD-10-CM

## 2020-01-27 DIAGNOSIS — E78.5 HYPERLIPIDEMIA, UNSPECIFIED HYPERLIPIDEMIA TYPE: ICD-10-CM

## 2020-01-27 DIAGNOSIS — F32.A DEPRESSION, UNSPECIFIED DEPRESSION TYPE: ICD-10-CM

## 2020-01-27 DIAGNOSIS — L40.50 PSORIATIC ARTHRITIS (HCC): ICD-10-CM

## 2020-01-27 DIAGNOSIS — R39.15 URINARY URGENCY: ICD-10-CM

## 2020-01-27 LAB
ALBUMIN SERPL-MCNC: 4 G/DL (ref 3.4–5)
ALBUMIN/GLOB SERPL: 1.3 {RATIO} (ref 1–2)
ALP LIVER SERPL-CCNC: 94 U/L (ref 45–117)
ALT SERPL-CCNC: 34 U/L (ref 16–61)
ANION GAP SERPL CALC-SCNC: 7 MMOL/L (ref 0–18)
AST SERPL-CCNC: 31 U/L (ref 15–37)
BILIRUB SERPL-MCNC: 0.9 MG/DL (ref 0.1–2)
BUN BLD-MCNC: 11 MG/DL (ref 7–18)
BUN/CREAT SERPL: 11.1 (ref 10–20)
CALCIUM BLD-MCNC: 8.6 MG/DL (ref 8.5–10.1)
CHLORIDE SERPL-SCNC: 109 MMOL/L (ref 98–112)
CHOLEST SMN-MCNC: 165 MG/DL (ref ?–200)
CO2 SERPL-SCNC: 24 MMOL/L (ref 21–32)
COMPLEXED PSA SERPL-MCNC: 1.98 NG/ML (ref ?–4)
CREAT BLD-MCNC: 0.99 MG/DL (ref 0.7–1.3)
DEPRECATED RDW RBC AUTO: 43.4 FL (ref 35.1–46.3)
ERYTHROCYTE [DISTWIDTH] IN BLOOD BY AUTOMATED COUNT: 12.3 % (ref 11–15)
GLOBULIN PLAS-MCNC: 3 G/DL (ref 2.8–4.4)
GLUCOSE BLD-MCNC: 95 MG/DL (ref 70–99)
HCT VFR BLD AUTO: 48.4 % (ref 39–53)
HDLC SERPL-MCNC: 42 MG/DL (ref 40–59)
HGB BLD-MCNC: 16.1 G/DL (ref 13–17.5)
LDLC SERPL CALC-MCNC: 103 MG/DL (ref ?–100)
M PROTEIN MFR SERPL ELPH: 7 G/DL (ref 6.4–8.2)
MCH RBC QN AUTO: 31.5 PG (ref 26–34)
MCHC RBC AUTO-ENTMCNC: 33.3 G/DL (ref 31–37)
MCV RBC AUTO: 94.7 FL (ref 80–100)
NONHDLC SERPL-MCNC: 123 MG/DL (ref ?–130)
OSMOLALITY SERPL CALC.SUM OF ELEC: 289 MOSM/KG (ref 275–295)
PATIENT FASTING Y/N/NP: YES
PATIENT FASTING Y/N/NP: YES
PLATELET # BLD AUTO: 150 10(3)UL (ref 150–450)
POTASSIUM SERPL-SCNC: 4.1 MMOL/L (ref 3.5–5.1)
RBC # BLD AUTO: 5.11 X10(6)UL (ref 4.3–5.7)
SODIUM SERPL-SCNC: 140 MMOL/L (ref 136–145)
T4 FREE SERPL-MCNC: 1.3 NG/DL (ref 0.8–1.7)
TRIGL SERPL-MCNC: 100 MG/DL (ref 30–149)
TSI SER-ACNC: 0.01 MIU/ML (ref 0.36–3.74)
VLDLC SERPL CALC-MCNC: 20 MG/DL (ref 0–30)
WBC # BLD AUTO: 5.3 X10(3) UL (ref 4–11)

## 2020-01-27 PROCEDURE — 80061 LIPID PANEL: CPT | Performed by: FAMILY MEDICINE

## 2020-01-27 PROCEDURE — 99396 PREV VISIT EST AGE 40-64: CPT | Performed by: FAMILY MEDICINE

## 2020-01-27 PROCEDURE — 80050 GENERAL HEALTH PANEL: CPT | Performed by: FAMILY MEDICINE

## 2020-01-27 PROCEDURE — 84153 ASSAY OF PSA TOTAL: CPT | Performed by: FAMILY MEDICINE

## 2020-01-27 PROCEDURE — 84439 ASSAY OF FREE THYROXINE: CPT | Performed by: FAMILY MEDICINE

## 2020-01-27 PROCEDURE — 36415 COLL VENOUS BLD VENIPUNCTURE: CPT | Performed by: FAMILY MEDICINE

## 2020-01-27 RX ORDER — DULOXETIN HYDROCHLORIDE 60 MG/1
60 CAPSULE, DELAYED RELEASE ORAL
Qty: 90 CAPSULE | Refills: 3 | Status: SHIPPED | OUTPATIENT
Start: 2020-01-27 | End: 2020-12-29

## 2020-01-27 RX ORDER — ATORVASTATIN CALCIUM 20 MG/1
TABLET, FILM COATED ORAL
Qty: 90 TABLET | Refills: 0 | Status: SHIPPED | OUTPATIENT
Start: 2020-01-27 | End: 2020-02-20

## 2020-02-04 RX ORDER — LEVOTHYROXINE SODIUM 0.15 MG/1
150 TABLET ORAL
Qty: 90 TABLET | Refills: 1 | Status: CANCELLED | OUTPATIENT
Start: 2020-02-04

## 2020-03-26 RX ORDER — DULOXETIN HYDROCHLORIDE 60 MG/1
60 CAPSULE, DELAYED RELEASE ORAL
Qty: 90 CAPSULE | Refills: 3 | OUTPATIENT
Start: 2020-03-26

## 2020-03-26 NOTE — TELEPHONE ENCOUNTER
Patient advised that the Duloxetine was filled on 1 27 2020 # 90 with 3 refills. Patient is going to contact [employer]. If there is a problem with the prescription he will sent a [employer] message.

## 2020-07-30 ENCOUNTER — MED REC SCAN ONLY (OUTPATIENT)
Dept: FAMILY MEDICINE CLINIC | Facility: CLINIC | Age: 60
End: 2020-07-30

## 2020-08-05 RX ORDER — LEVOTHYROXINE SODIUM 0.15 MG/1
150 TABLET ORAL
Qty: 90 TABLET | Refills: 1 | Status: SHIPPED | OUTPATIENT
Start: 2020-08-05 | End: 2021-04-10 | Stop reason: DRUGHIGH

## 2020-08-10 ENCOUNTER — EXTERNAL RECORD (OUTPATIENT)
Dept: HEALTH INFORMATION MANAGEMENT | Facility: OTHER | Age: 60
End: 2020-08-10

## 2020-10-01 ENCOUNTER — IMMUNIZATION (OUTPATIENT)
Dept: FAMILY MEDICINE CLINIC | Facility: CLINIC | Age: 60
End: 2020-10-01
Payer: COMMERCIAL

## 2020-10-01 DIAGNOSIS — Z23 NEED FOR VACCINATION: ICD-10-CM

## 2020-10-01 PROCEDURE — 90471 IMMUNIZATION ADMIN: CPT | Performed by: FAMILY MEDICINE

## 2020-10-01 PROCEDURE — 90686 IIV4 VACC NO PRSV 0.5 ML IM: CPT | Performed by: FAMILY MEDICINE

## 2020-10-29 ENCOUNTER — TELEPHONE (OUTPATIENT)
Dept: FAMILY MEDICINE CLINIC | Facility: CLINIC | Age: 60
End: 2020-10-29

## 2020-10-29 NOTE — TELEPHONE ENCOUNTER
Pt rolled his ankle on Sunday. He is still in pain and cannot put pressure on it. He wants to know if he should make an appt or give it more time?     Please advise

## 2020-10-30 ENCOUNTER — HOSPITAL ENCOUNTER (OUTPATIENT)
Dept: GENERAL RADIOLOGY | Age: 60
Discharge: HOME | End: 2020-10-30
Attending: FAMILY MEDICINE
Payer: COMMERCIAL

## 2020-10-30 ENCOUNTER — TELEPHONE (OUTPATIENT)
Dept: FAMILY MEDICINE CLINIC | Facility: CLINIC | Age: 60
End: 2020-10-30

## 2020-10-30 DIAGNOSIS — M79.671 RIGHT FOOT PAIN: ICD-10-CM

## 2020-10-30 DIAGNOSIS — M25.571 ACUTE RIGHT ANKLE PAIN: Primary | ICD-10-CM

## 2020-10-30 DIAGNOSIS — M25.571 ACUTE RIGHT ANKLE PAIN: ICD-10-CM

## 2020-10-30 PROCEDURE — 73630 X-RAY EXAM OF FOOT: CPT | Performed by: FAMILY MEDICINE

## 2020-10-30 PROCEDURE — 73610 X-RAY EXAM OF ANKLE: CPT | Performed by: FAMILY MEDICINE

## 2020-10-30 RX ORDER — MELOXICAM 15 MG/1
15 TABLET ORAL DAILY
COMMUNITY
Start: 2020-10-07 | End: 2020-12-29

## 2020-10-30 NOTE — TELEPHONE ENCOUNTER
He was made aware of need to cancel his appt today. He twisted the R ankle and foot awkwardly. Most of the pain is at the outside foot. Xray orders placed. He will be contacted with results. I recommended a CAM boot walker if difficulty bearing wt.   He

## 2020-12-17 ENCOUNTER — NURSE ONLY (OUTPATIENT)
Dept: FAMILY MEDICINE CLINIC | Facility: CLINIC | Age: 60
End: 2020-12-17
Payer: COMMERCIAL

## 2020-12-17 ENCOUNTER — MED REC SCAN ONLY (OUTPATIENT)
Dept: FAMILY MEDICINE CLINIC | Facility: CLINIC | Age: 60
End: 2020-12-17

## 2020-12-17 DIAGNOSIS — Z51.81 THERAPEUTIC DRUG MONITORING: Primary | ICD-10-CM

## 2020-12-17 PROCEDURE — 36415 COLL VENOUS BLD VENIPUNCTURE: CPT | Performed by: FAMILY MEDICINE

## 2020-12-17 PROCEDURE — 80076 HEPATIC FUNCTION PANEL: CPT | Performed by: FAMILY MEDICINE

## 2020-12-17 PROCEDURE — 85025 COMPLETE CBC W/AUTO DIFF WBC: CPT | Performed by: FAMILY MEDICINE

## 2020-12-17 NOTE — PATIENT INSTRUCTIONS
Blood work drawn for Dr. Maryana Arellano  1 green  1 mint   22g left antecubital   Hepatic Function panel and CBC

## 2020-12-24 ENCOUNTER — LAB ENCOUNTER (OUTPATIENT)
Dept: LAB | Age: 60
End: 2020-12-24
Attending: INTERNAL MEDICINE
Payer: COMMERCIAL

## 2020-12-24 DIAGNOSIS — G89.4 CHRONIC PAIN SYNDROME: ICD-10-CM

## 2020-12-24 DIAGNOSIS — M89.49 HAGNER'S DISEASE: Primary | ICD-10-CM

## 2020-12-24 DIAGNOSIS — M25.511 RIGHT SHOULDER PAIN: ICD-10-CM

## 2020-12-24 DIAGNOSIS — M25.512 LEFT SHOULDER PAIN: ICD-10-CM

## 2020-12-24 PROCEDURE — 86376 MICROSOMAL ANTIBODY EACH: CPT

## 2020-12-24 PROCEDURE — 86480 TB TEST CELL IMMUN MEASURE: CPT

## 2020-12-24 PROCEDURE — 86706 HEP B SURFACE ANTIBODY: CPT

## 2020-12-24 PROCEDURE — 36415 COLL VENOUS BLD VENIPUNCTURE: CPT

## 2020-12-24 PROCEDURE — 86812 HLA TYPING A B OR C: CPT

## 2020-12-24 PROCEDURE — 86803 HEPATITIS C AB TEST: CPT

## 2020-12-24 PROCEDURE — 86704 HEP B CORE ANTIBODY TOTAL: CPT

## 2020-12-24 PROCEDURE — 87340 HEPATITIS B SURFACE AG IA: CPT

## 2020-12-24 PROCEDURE — 84560 ASSAY OF URINE/URIC ACID: CPT

## 2020-12-24 PROCEDURE — 85652 RBC SED RATE AUTOMATED: CPT

## 2020-12-29 ENCOUNTER — PATIENT MESSAGE (OUTPATIENT)
Dept: FAMILY MEDICINE CLINIC | Facility: CLINIC | Age: 60
End: 2020-12-29

## 2020-12-29 NOTE — TELEPHONE ENCOUNTER
From: Jamin Werner  To: Aidan Mcclure MD  Sent: 12/29/2020 1:58 PM CST  Subject: Other    Hey Doc,  I went to reorder a replacement mask for my CPAP and they told me they no longer carry that mask.  I will need to be fitted for a different one but they w

## 2021-01-07 ENCOUNTER — MED REC SCAN ONLY (OUTPATIENT)
Dept: FAMILY MEDICINE CLINIC | Facility: CLINIC | Age: 61
End: 2021-01-07

## 2021-01-07 NOTE — PROGRESS NOTES
Brig[facility] of [employer]- Request for additional information. Form and order faxed to [phone]. Copy sent to scanning.

## 2021-01-29 ENCOUNTER — OFFICE VISIT (OUTPATIENT)
Dept: FAMILY MEDICINE CLINIC | Facility: CLINIC | Age: 61
End: 2021-01-29
Payer: COMMERCIAL

## 2021-01-29 VITALS
HEART RATE: 85 BPM | WEIGHT: 229.63 LBS | SYSTOLIC BLOOD PRESSURE: 114 MMHG | RESPIRATION RATE: 17 BRPM | TEMPERATURE: 97 F | DIASTOLIC BLOOD PRESSURE: 70 MMHG | HEIGHT: 71.5 IN | OXYGEN SATURATION: 98 % | BODY MASS INDEX: 31.45 KG/M2

## 2021-01-29 DIAGNOSIS — L40.50 PSORIATIC ARTHRITIS (HCC): ICD-10-CM

## 2021-01-29 DIAGNOSIS — F32.A DEPRESSION, UNSPECIFIED DEPRESSION TYPE: ICD-10-CM

## 2021-01-29 DIAGNOSIS — E03.9 HYPOTHYROIDISM, UNSPECIFIED TYPE: ICD-10-CM

## 2021-01-29 DIAGNOSIS — Z00.00 WELL ADULT EXAM: Primary | ICD-10-CM

## 2021-01-29 DIAGNOSIS — E78.5 HYPERLIPIDEMIA, UNSPECIFIED HYPERLIPIDEMIA TYPE: ICD-10-CM

## 2021-01-29 DIAGNOSIS — F41.9 ANXIETY: ICD-10-CM

## 2021-01-29 LAB
CHOLEST SMN-MCNC: 140 MG/DL (ref ?–200)
COMPLEXED PSA SERPL-MCNC: 2.43 NG/ML (ref ?–4)
HDLC SERPL-MCNC: 44 MG/DL (ref 40–59)
LDLC SERPL CALC-MCNC: 75 MG/DL (ref ?–100)
NONHDLC SERPL-MCNC: 96 MG/DL (ref ?–130)
PATIENT FASTING Y/N/NP: YES
T3FREE SERPL-MCNC: 3.88 PG/ML (ref 2.4–4.2)
T4 FREE SERPL-MCNC: 1.5 NG/DL (ref 0.8–1.7)
TRIGL SERPL-MCNC: 105 MG/DL (ref 30–149)
TSI SER-ACNC: <0.005 MIU/ML (ref 0.36–3.74)
VLDLC SERPL CALC-MCNC: 21 MG/DL (ref 0–30)

## 2021-01-29 PROCEDURE — 99396 PREV VISIT EST AGE 40-64: CPT | Performed by: FAMILY MEDICINE

## 2021-01-29 PROCEDURE — 84481 FREE ASSAY (FT-3): CPT | Performed by: FAMILY MEDICINE

## 2021-01-29 PROCEDURE — 3074F SYST BP LT 130 MM HG: CPT | Performed by: FAMILY MEDICINE

## 2021-01-29 PROCEDURE — 3078F DIAST BP <80 MM HG: CPT | Performed by: FAMILY MEDICINE

## 2021-01-29 PROCEDURE — 3008F BODY MASS INDEX DOCD: CPT | Performed by: FAMILY MEDICINE

## 2021-01-29 PROCEDURE — 80061 LIPID PANEL: CPT | Performed by: FAMILY MEDICINE

## 2021-01-29 PROCEDURE — 84443 ASSAY THYROID STIM HORMONE: CPT | Performed by: FAMILY MEDICINE

## 2021-01-29 PROCEDURE — 36415 COLL VENOUS BLD VENIPUNCTURE: CPT | Performed by: FAMILY MEDICINE

## 2021-01-29 PROCEDURE — 84153 ASSAY OF PSA TOTAL: CPT | Performed by: FAMILY MEDICINE

## 2021-01-29 PROCEDURE — 84439 ASSAY OF FREE THYROXINE: CPT | Performed by: FAMILY MEDICINE

## 2021-01-29 RX ORDER — MELOXICAM 15 MG/1
15 TABLET ORAL DAILY
COMMUNITY
Start: 2021-01-11

## 2021-01-29 RX ORDER — CITALOPRAM 20 MG/1
20 TABLET ORAL DAILY
Qty: 90 TABLET | Refills: 0 | Status: SHIPPED | OUTPATIENT
Start: 2021-01-29 | End: 2021-04-27

## 2021-01-29 RX ORDER — TAMSULOSIN HYDROCHLORIDE 0.4 MG/1
0.4 CAPSULE ORAL NIGHTLY
COMMUNITY

## 2021-01-29 RX ORDER — CELECOXIB 200 MG/1
200 CAPSULE ORAL 2 TIMES DAILY
Refills: 0 | COMMUNITY

## 2021-01-29 RX ORDER — TERBINAFINE HYDROCHLORIDE 250 MG/1
TABLET ORAL
COMMUNITY
Start: 2020-12-21

## 2021-01-29 NOTE — PROGRESS NOTES
Brandee Viera is a 64year old male.     CC:  Patient presents with:  Physical: per pt      HPI:  Yearly PX    Last lipid:  Lab Results   Component Value Date    CHOLEST 165 01/27/2020    TRIG 100 01/27/2020    HDL 42 01/27/2020     (H) 01/27/2020 capsule (200 mg total) by mouth 2 (two) times daily. 0   • Terbinafine HCl 250 MG Oral Tab TAKE 1 TABLET BY MOUTH ONCE DAILY FOR 2 WEEKS THEN 2 WEEKS OFF MEDICATION THEN REPEAT     • Meloxicam 15 MG Oral Tab Take 15 mg by mouth daily.      • DULoxetine HCl use: No       ROS:  General: energy level stable  Cardiovascular/Pulses: Denies palpitations, tachycardia, irregular heart beat, chest pain, exertional chest pain or pressure  Respiratory: New CPAP mask fits well, he is happy with the result  GI: Denies he me an update on his mood in about 6 weeks    5. Anxiety  As in #4    6.  Psoriatic arthritis ([address]  Continue to work with Rheumatology      Orders for this visit:    Orders Placed This Encounter      TSH and Free T4 [E]          Standing Status: Future

## 2021-02-19 ENCOUNTER — MED REC SCAN ONLY (OUTPATIENT)
Dept: FAMILY MEDICINE CLINIC | Facility: CLINIC | Age: 61
End: 2021-02-19

## 2021-03-02 ENCOUNTER — PATIENT MESSAGE (OUTPATIENT)
Dept: FAMILY MEDICINE CLINIC | Facility: CLINIC | Age: 61
End: 2021-03-02

## 2021-03-02 RX ORDER — ATORVASTATIN CALCIUM 20 MG/1
TABLET, FILM COATED ORAL
Qty: 90 TABLET | Refills: 3 | Status: SHIPPED | OUTPATIENT
Start: 2021-03-02

## 2021-04-08 ENCOUNTER — TELEPHONE (OUTPATIENT)
Dept: FAMILY MEDICINE CLINIC | Facility: CLINIC | Age: 61
End: 2021-04-08

## 2021-04-08 NOTE — TELEPHONE ENCOUNTER
Pt went to [facility] lab on 12-24-20 to get lab work done for Dr Shai Murcia.   He stated we did a uric acid urine and it should have been a blood draw.  He needs to get it re-done for Dr Shai Murcia but wants to know if he will be charge since our  did the wrong

## 2021-04-08 NOTE — TELEPHONE ENCOUNTER
Phone call to patient. He was advised that the order from Dr. Chadwick Cain on 12 24 2020 was a Uric Acid Urine random- That is why the lab did the urine. Patient verbalized understanding.    Appointment made for patient to come in and have the blood work for Dr. Ancelmo Barragan

## 2021-04-09 ENCOUNTER — NURSE ONLY (OUTPATIENT)
Dept: FAMILY MEDICINE CLINIC | Facility: CLINIC | Age: 61
End: 2021-04-09
Payer: COMMERCIAL

## 2021-04-09 DIAGNOSIS — G89.4 CHRONIC PAIN SYNDROME: ICD-10-CM

## 2021-04-09 DIAGNOSIS — M25.50 POLYARTHRALGIA: ICD-10-CM

## 2021-04-09 DIAGNOSIS — L40.0 PSORIASIS VULGARIS: ICD-10-CM

## 2021-04-09 DIAGNOSIS — M15.9 PRIMARY OSTEOARTHRITIS INVOLVING MULTIPLE JOINTS: Primary | ICD-10-CM

## 2021-04-09 DIAGNOSIS — E03.9 HYPOTHYROIDISM, UNSPECIFIED TYPE: ICD-10-CM

## 2021-04-09 PROCEDURE — 84481 FREE ASSAY (FT-3): CPT | Performed by: FAMILY MEDICINE

## 2021-04-09 PROCEDURE — 84439 ASSAY OF FREE THYROXINE: CPT | Performed by: FAMILY MEDICINE

## 2021-04-09 PROCEDURE — 84550 ASSAY OF BLOOD/URIC ACID: CPT | Performed by: FAMILY MEDICINE

## 2021-04-09 PROCEDURE — 84443 ASSAY THYROID STIM HORMONE: CPT | Performed by: FAMILY MEDICINE

## 2021-04-09 PROCEDURE — 36415 COLL VENOUS BLD VENIPUNCTURE: CPT | Performed by: FAMILY MEDICINE

## 2021-04-09 NOTE — PATIENT INSTRUCTIONS
Blood work drawn per orders in chart for   Blood work drawn per orders for Dr. Vijay Conklin  1 green  Thyroid and Uric Acid   22g right AC  Fax results to [phone]

## 2021-04-10 ENCOUNTER — MED REC SCAN ONLY (OUTPATIENT)
Dept: FAMILY MEDICINE CLINIC | Facility: CLINIC | Age: 61
End: 2021-04-10

## 2021-04-10 RX ORDER — LEVOTHYROXINE SODIUM 0.12 MG/1
125 TABLET ORAL
Qty: 90 TABLET | Refills: 0 | Status: SHIPPED | OUTPATIENT
Start: 2021-04-10 | End: 2021-06-28

## 2021-04-22 ENCOUNTER — NURSE ONLY (OUTPATIENT)
Dept: FAMILY MEDICINE CLINIC | Facility: CLINIC | Age: 61
End: 2021-04-22
Payer: COMMERCIAL

## 2021-04-22 DIAGNOSIS — M15.9 PRIMARY OSTEOARTHRITIS INVOLVING MULTIPLE JOINTS: Primary | ICD-10-CM

## 2021-04-22 DIAGNOSIS — L40.9 PSORIASIS: ICD-10-CM

## 2021-04-22 DIAGNOSIS — G89.4 CHRONIC PAIN SYNDROME: ICD-10-CM

## 2021-04-22 DIAGNOSIS — M25.50 POLYARTHRALGIA: ICD-10-CM

## 2021-04-22 PROCEDURE — 80053 COMPREHEN METABOLIC PANEL: CPT | Performed by: FAMILY MEDICINE

## 2021-04-22 PROCEDURE — 85025 COMPLETE CBC W/AUTO DIFF WBC: CPT | Performed by: FAMILY MEDICINE

## 2021-04-22 PROCEDURE — 85652 RBC SED RATE AUTOMATED: CPT | Performed by: FAMILY MEDICINE

## 2021-04-22 NOTE — PROGRESS NOTES
Patient to clinic for labs ordered per Dr Katie Orona, DO  Order sent to scanning. Fax results to Dr Trent Lo at [phone]    Uric acid not drawn. Per patient, he had uric acid done last week and not needed today.     Mint and lavender tube drawn right AC

## 2021-04-23 ENCOUNTER — IMAGING SERVICES (OUTPATIENT)
Dept: GENERAL RADIOLOGY | Age: 61
End: 2021-04-23
Attending: ORTHOPAEDIC SURGERY

## 2021-04-23 ENCOUNTER — OFFICE VISIT (OUTPATIENT)
Dept: ORTHOPEDICS | Age: 61
End: 2021-04-23

## 2021-04-23 VITALS — WEIGHT: 225 LBS | BODY MASS INDEX: 30.48 KG/M2 | HEIGHT: 72 IN

## 2021-04-23 DIAGNOSIS — S83.231A COMPLEX TEAR OF MEDIAL MENISCUS OF RIGHT KNEE AS CURRENT INJURY, INITIAL ENCOUNTER: Primary | ICD-10-CM

## 2021-04-23 DIAGNOSIS — S89.91XA INJURY OF RIGHT KNEE, INITIAL ENCOUNTER: ICD-10-CM

## 2021-04-23 PROCEDURE — 73562 X-RAY EXAM OF KNEE 3: CPT | Performed by: RADIOLOGY

## 2021-04-23 PROCEDURE — 99203 OFFICE O/P NEW LOW 30 MIN: CPT | Performed by: ORTHOPAEDIC SURGERY

## 2021-04-23 PROCEDURE — 20610 DRAIN/INJ JOINT/BURSA W/O US: CPT | Performed by: ORTHOPAEDIC SURGERY

## 2021-04-23 RX ORDER — METHYLPREDNISOLONE ACETATE 40 MG/ML
40 INJECTION, SUSPENSION INTRA-ARTICULAR; INTRALESIONAL; INTRAMUSCULAR; SOFT TISSUE
Status: COMPLETED | OUTPATIENT
Start: 2021-04-23 | End: 2021-04-23

## 2021-04-23 RX ORDER — LEVOTHYROXINE SODIUM 0.12 MG/1
125 TABLET ORAL
COMMUNITY
Start: 2021-04-10

## 2021-04-23 RX ORDER — TERBINAFINE HYDROCHLORIDE 250 MG/1
TABLET ORAL
COMMUNITY
Start: 2020-12-21

## 2021-04-23 RX ORDER — CELECOXIB 200 MG/1
CAPSULE ORAL
COMMUNITY
Start: 2021-04-20 | End: 2021-04-23

## 2021-04-23 RX ORDER — METHYLPREDNISOLONE 4 MG/1
TABLET ORAL
COMMUNITY
Start: 2021-04-17

## 2021-04-23 RX ORDER — FOLIC ACID 1 MG/1
TABLET ORAL
COMMUNITY
Start: 2021-03-27

## 2021-04-23 RX ORDER — CELECOXIB 200 MG/1
200 CAPSULE ORAL
COMMUNITY

## 2021-04-23 RX ORDER — ATORVASTATIN CALCIUM 20 MG/1
20 TABLET, FILM COATED ORAL NIGHTLY
COMMUNITY
Start: 2021-03-02 | End: 2021-04-23

## 2021-04-23 RX ORDER — CITALOPRAM 20 MG/1
20 TABLET ORAL
COMMUNITY
Start: 2021-01-29

## 2021-04-23 RX ORDER — TAMSULOSIN HYDROCHLORIDE 0.4 MG/1
0.4 CAPSULE ORAL
COMMUNITY

## 2021-04-23 RX ORDER — TAMSULOSIN HYDROCHLORIDE 0.4 MG/1
0.8 CAPSULE ORAL NIGHTLY
COMMUNITY
Start: 2021-03-19 | End: 2021-04-23

## 2021-04-23 RX ORDER — HALOBETASOL PROPIONATE 0.05 %
OINTMENT (GRAM) TOPICAL
COMMUNITY
Start: 2016-11-09

## 2021-04-23 RX ORDER — ATORVASTATIN CALCIUM 20 MG/1
TABLET, FILM COATED ORAL
COMMUNITY
Start: 2020-02-20

## 2021-04-23 RX ORDER — CLOBETASOL PROPIONATE 0.5 MG/G
OINTMENT TOPICAL
COMMUNITY
Start: 2019-10-03

## 2021-04-23 RX ORDER — METHOTREXATE 2.5 MG/1
10 TABLET ORAL
COMMUNITY
Start: 2021-04-20

## 2021-04-23 RX ADMIN — METHYLPREDNISOLONE ACETATE 40 MG: 40 INJECTION, SUSPENSION INTRA-ARTICULAR; INTRALESIONAL; INTRAMUSCULAR; SOFT TISSUE at 11:17

## 2021-04-27 RX ORDER — CITALOPRAM 20 MG/1
20 TABLET ORAL DAILY
Qty: 90 TABLET | Refills: 2 | Status: SHIPPED | OUTPATIENT
Start: 2021-04-27 | End: 2022-01-27

## 2021-05-10 ENCOUNTER — TELEPHONE (OUTPATIENT)
Dept: FAMILY MEDICINE CLINIC | Facility: CLINIC | Age: 61
End: 2021-05-10

## 2021-05-21 ENCOUNTER — MED REC SCAN ONLY (OUTPATIENT)
Dept: FAMILY MEDICINE CLINIC | Facility: CLINIC | Age: 61
End: 2021-05-21

## 2021-05-26 ENCOUNTER — OFFICE VISIT (OUTPATIENT)
Dept: ORTHOPEDICS | Age: 61
End: 2021-05-26

## 2021-05-26 DIAGNOSIS — S83.231A COMPLEX TEAR OF MEDIAL MENISCUS OF RIGHT KNEE AS CURRENT INJURY, INITIAL ENCOUNTER: Primary | ICD-10-CM

## 2021-05-26 PROCEDURE — 99213 OFFICE O/P EST LOW 20 MIN: CPT | Performed by: ORTHOPAEDIC SURGERY

## 2021-05-26 RX ORDER — CITALOPRAM 20 MG/1
20 TABLET ORAL
COMMUNITY
Start: 2021-04-27

## 2021-06-14 ENCOUNTER — E-ADVICE (OUTPATIENT)
Dept: ORTHOPEDICS | Age: 61
End: 2021-06-14

## 2021-06-17 ENCOUNTER — TELEPHONE (OUTPATIENT)
Dept: FAMILY MEDICINE CLINIC | Facility: CLINIC | Age: 61
End: 2021-06-17

## 2021-06-17 NOTE — TELEPHONE ENCOUNTER
PATIENT NEEDS HIS THYROID LABS AFTER 8 WEEKS ON MEDICATION. HE NEEDS A Saturday AND THERE IS NONE TILL July 10TH.   HE JUST STARTED A NEW JOB AND CAN NOT COME THROUGH THE WEEK

## 2021-06-26 ENCOUNTER — NURSE ONLY (OUTPATIENT)
Dept: FAMILY MEDICINE CLINIC | Facility: CLINIC | Age: 61
End: 2021-06-26
Payer: COMMERCIAL

## 2021-06-26 DIAGNOSIS — E03.9 HYPOTHYROIDISM, UNSPECIFIED TYPE: ICD-10-CM

## 2021-06-26 PROCEDURE — 84443 ASSAY THYROID STIM HORMONE: CPT | Performed by: FAMILY MEDICINE

## 2021-06-26 PROCEDURE — 84439 ASSAY OF FREE THYROXINE: CPT | Performed by: FAMILY MEDICINE

## 2021-06-26 NOTE — PROGRESS NOTES
Pt here for lab draw ordered by ADRIANO. 1 light green tube drawn from right arm using a 1\" needle. Pt left in good condition.

## 2021-06-28 ENCOUNTER — TELEPHONE (OUTPATIENT)
Dept: FAMILY MEDICINE CLINIC | Facility: CLINIC | Age: 61
End: 2021-06-28

## 2021-06-28 RX ORDER — LEVOTHYROXINE SODIUM 0.12 MG/1
125 TABLET ORAL
Qty: 90 TABLET | Refills: 0 | Status: SHIPPED | OUTPATIENT
Start: 2021-06-28 | End: 2021-09-28

## 2021-06-28 NOTE — TELEPHONE ENCOUNTER
Pj Reno MD   6/28/2021  9:47 AM CDT Back to Top      Please let patient or caregiver know or leave message that his t4 level remains in normal range with the lowering of thyroid medication dose.  However his tsh is in a much better place as compared

## 2021-06-28 NOTE — TELEPHONE ENCOUNTER
Patient notified and verbalized understanding.    Nurse visit scheduled  Future Appointments   Date Time Provider Pierre Dukes   9/25/2021  8:30 AM  [facility] Raul Baca        Patient requesting refill sent to [address] [place] [address]

## 2021-07-14 ENCOUNTER — MED REC SCAN ONLY (OUTPATIENT)
Dept: FAMILY MEDICINE CLINIC | Facility: CLINIC | Age: 61
End: 2021-07-14

## 2021-07-14 NOTE — PROGRESS NOTES
Brigido of [facility]- Physician Order  Signed by Dr. Mike Kong to [phone]. Copy sent to scanning.

## 2021-08-16 ENCOUNTER — LAB ENCOUNTER (OUTPATIENT)
Dept: LAB | Age: 61
End: 2021-08-16
Attending: FAMILY MEDICINE
Payer: COMMERCIAL

## 2021-08-16 DIAGNOSIS — G89.4 CHRONIC PAIN SYNDROME: ICD-10-CM

## 2021-08-16 DIAGNOSIS — Z79.899 DRUG THERAPY: ICD-10-CM

## 2021-08-16 DIAGNOSIS — L40.9 PSORIASIS: Primary | ICD-10-CM

## 2021-08-16 DIAGNOSIS — E03.9 HYPOTHYROIDISM, UNSPECIFIED TYPE: ICD-10-CM

## 2021-08-16 DIAGNOSIS — L40.50 PSORIATIC ARTHROPATHY (HCC): ICD-10-CM

## 2021-08-16 LAB
ALBUMIN SERPL-MCNC: 3.8 G/DL (ref 3.4–5)
ALBUMIN/GLOB SERPL: 1.5 {RATIO} (ref 1–2)
ALP LIVER SERPL-CCNC: 84 U/L
ALT SERPL-CCNC: 35 U/L
ANION GAP SERPL CALC-SCNC: 4 MMOL/L (ref 0–18)
AST SERPL-CCNC: 24 U/L (ref 15–37)
BASOPHILS # BLD AUTO: 0.03 X10(3) UL (ref 0–0.2)
BASOPHILS NFR BLD AUTO: 0.5 %
BILIRUB SERPL-MCNC: 0.9 MG/DL (ref 0.1–2)
BUN BLD-MCNC: 16 MG/DL (ref 7–18)
CALCIUM BLD-MCNC: 8.7 MG/DL (ref 8.5–10.1)
CHLORIDE SERPL-SCNC: 111 MMOL/L (ref 98–112)
CO2 SERPL-SCNC: 26 MMOL/L (ref 21–32)
CREAT BLD-MCNC: 0.88 MG/DL
EOSINOPHIL # BLD AUTO: 0.03 X10(3) UL (ref 0–0.7)
EOSINOPHIL NFR BLD AUTO: 0.5 %
ERYTHROCYTE [DISTWIDTH] IN BLOOD BY AUTOMATED COUNT: 12.9 %
GLOBULIN PLAS-MCNC: 2.6 G/DL (ref 2.8–4.4)
GLUCOSE BLD-MCNC: 94 MG/DL (ref 70–99)
HCT VFR BLD AUTO: 47.3 %
HGB BLD-MCNC: 15.4 G/DL
IMM GRANULOCYTES # BLD AUTO: 0.02 X10(3) UL (ref 0–1)
IMM GRANULOCYTES NFR BLD: 0.3 %
LYMPHOCYTES # BLD AUTO: 1.61 X10(3) UL (ref 1–4)
LYMPHOCYTES NFR BLD AUTO: 27 %
M PROTEIN MFR SERPL ELPH: 6.4 G/DL (ref 6.4–8.2)
MCH RBC QN AUTO: 32.2 PG (ref 26–34)
MCHC RBC AUTO-ENTMCNC: 32.6 G/DL (ref 31–37)
MCV RBC AUTO: 98.7 FL
MONOCYTES # BLD AUTO: 0.48 X10(3) UL (ref 0.1–1)
MONOCYTES NFR BLD AUTO: 8 %
NEUTROPHILS # BLD AUTO: 3.8 X10 (3) UL (ref 1.5–7.7)
NEUTROPHILS # BLD AUTO: 3.8 X10(3) UL (ref 1.5–7.7)
NEUTROPHILS NFR BLD AUTO: 63.7 %
OSMOLALITY SERPL CALC.SUM OF ELEC: 293 MOSM/KG (ref 275–295)
PATIENT FASTING Y/N/NP: YES
PLATELET # BLD AUTO: 196 10(3)UL (ref 150–450)
POTASSIUM SERPL-SCNC: 4.1 MMOL/L (ref 3.5–5.1)
RBC # BLD AUTO: 4.79 X10(6)UL
SED RATE-ML: 11 MM/HR
SODIUM SERPL-SCNC: 141 MMOL/L (ref 136–145)
WBC # BLD AUTO: 6 X10(3) UL (ref 4–11)

## 2021-08-16 PROCEDURE — 85025 COMPLETE CBC W/AUTO DIFF WBC: CPT

## 2021-08-16 PROCEDURE — 36415 COLL VENOUS BLD VENIPUNCTURE: CPT

## 2021-08-16 PROCEDURE — 85652 RBC SED RATE AUTOMATED: CPT

## 2021-08-16 PROCEDURE — 80053 COMPREHEN METABOLIC PANEL: CPT

## 2021-08-24 ENCOUNTER — MED REC SCAN ONLY (OUTPATIENT)
Dept: FAMILY MEDICINE CLINIC | Facility: CLINIC | Age: 61
End: 2021-08-24

## 2021-08-25 ENCOUNTER — TELEPHONE (OUTPATIENT)
Dept: FAMILY MEDICINE CLINIC | Facility: CLINIC | Age: 61
End: 2021-08-25

## 2021-08-25 NOTE — TELEPHONE ENCOUNTER
[address]  [phone], [phone]    PHARMACY HAS QUESTION ON PT'S MEDICATION- PLEASE ADVISE  THANK YOU

## 2021-08-25 NOTE — TELEPHONE ENCOUNTER
Paris advised that patient is transferring prescriptions to [facility]. She states that they where advised that patient has an allergy to statins. She waned to know how long patient has been on the Atorvastatin and has he had any issues.    Advised that patient

## 2021-09-17 ENCOUNTER — OFFICE VISIT (OUTPATIENT)
Dept: ORTHOPEDICS | Age: 61
End: 2021-09-17

## 2021-09-17 VITALS — BODY MASS INDEX: 29.8 KG/M2 | WEIGHT: 220 LBS | HEIGHT: 72 IN

## 2021-09-17 DIAGNOSIS — M22.2X2 PATELLOFEMORAL ARTHRALGIA OF BOTH KNEES: Primary | ICD-10-CM

## 2021-09-17 DIAGNOSIS — S83.231D COMPLEX TEAR OF MEDIAL MENISCUS OF RIGHT KNEE AS CURRENT INJURY, SUBSEQUENT ENCOUNTER: ICD-10-CM

## 2021-09-17 DIAGNOSIS — M22.2X1 PATELLOFEMORAL ARTHRALGIA OF BOTH KNEES: Primary | ICD-10-CM

## 2021-09-17 PROCEDURE — 99213 OFFICE O/P EST LOW 20 MIN: CPT | Performed by: ORTHOPAEDIC SURGERY

## 2021-09-25 ENCOUNTER — NURSE ONLY (OUTPATIENT)
Dept: FAMILY MEDICINE CLINIC | Facility: CLINIC | Age: 61
End: 2021-09-25
Payer: COMMERCIAL

## 2021-09-25 DIAGNOSIS — G89.4 CHRONIC PAIN SYNDROME: ICD-10-CM

## 2021-09-25 DIAGNOSIS — L40.50 PSORIATIC ARTHROPATHY (HCC): ICD-10-CM

## 2021-09-25 DIAGNOSIS — E03.9 HYPOTHYROIDISM, UNSPECIFIED TYPE: ICD-10-CM

## 2021-09-25 DIAGNOSIS — Z79.899 DRUG THERAPY: ICD-10-CM

## 2021-09-25 DIAGNOSIS — L40.9 PSORIASIS: ICD-10-CM

## 2021-09-25 LAB
T4 FREE SERPL-MCNC: 1.3 NG/DL (ref 0.8–1.7)
TSI SER-ACNC: 0.01 MIU/ML (ref 0.36–3.74)

## 2021-09-25 PROCEDURE — 84443 ASSAY THYROID STIM HORMONE: CPT | Performed by: FAMILY MEDICINE

## 2021-09-25 PROCEDURE — 84439 ASSAY OF FREE THYROXINE: CPT | Performed by: FAMILY MEDICINE

## 2021-09-25 NOTE — PROGRESS NOTES
Pt was in office for labs per TJ    1 mint tube collected from [facility] using straight needle and 1 attempt    Pt tolerated and was sent home in stable condition

## 2021-09-28 ENCOUNTER — PATIENT MESSAGE (OUTPATIENT)
Dept: FAMILY MEDICINE CLINIC | Facility: CLINIC | Age: 61
End: 2021-09-28

## 2021-09-28 RX ORDER — LEVOTHYROXINE SODIUM 0.12 MG/1
125 TABLET ORAL
Qty: 90 TABLET | Refills: 1 | Status: SHIPPED | OUTPATIENT
Start: 2021-09-28 | End: 2021-12-13 | Stop reason: DRUGHIGH

## 2021-09-28 NOTE — TELEPHONE ENCOUNTER
From: Soha Werner  To:  Sally Herron MD  Sent: 9/28/2021 1:21 PM CDT  Subject: Question regarding TSH+FREE T4    Please send Levoxyl Rx to:  [facility]

## 2021-10-08 ENCOUNTER — MED REC SCAN ONLY (OUTPATIENT)
Dept: FAMILY MEDICINE CLINIC | Facility: CLINIC | Age: 61
End: 2021-10-08

## 2021-12-07 ENCOUNTER — PATIENT MESSAGE (OUTPATIENT)
Dept: FAMILY MEDICINE CLINIC | Facility: CLINIC | Age: 61
End: 2021-12-07

## 2021-12-08 NOTE — TELEPHONE ENCOUNTER
From: Shade Werner  To: Liz Lee MD  Sent: 12/7/2021 7:11 PM CST  Subject: Covid Booster    Hello,  I received my Covid vaccines in January and February and contracted the virus in September. Am I now able to get the booster? Thank you.

## 2021-12-11 ENCOUNTER — NURSE ONLY (OUTPATIENT)
Dept: FAMILY MEDICINE CLINIC | Facility: CLINIC | Age: 61
End: 2021-12-11
Payer: COMMERCIAL

## 2021-12-11 ENCOUNTER — TELEPHONE (OUTPATIENT)
Dept: FAMILY MEDICINE CLINIC | Facility: CLINIC | Age: 61
End: 2021-12-11

## 2021-12-11 DIAGNOSIS — E03.9 HYPOTHYROIDISM, UNSPECIFIED TYPE: ICD-10-CM

## 2021-12-11 PROCEDURE — 84439 ASSAY OF FREE THYROXINE: CPT | Performed by: FAMILY MEDICINE

## 2021-12-11 PROCEDURE — 84443 ASSAY THYROID STIM HORMONE: CPT | Performed by: FAMILY MEDICINE

## 2021-12-11 NOTE — PROGRESS NOTES
Pt here for labs ordered by ADRIANO. 1 gold tube drawn from pts right arm using 1\" needle. Pt left in good condition.

## 2021-12-11 NOTE — TELEPHONE ENCOUNTER
Pt had a nurse visit today and had to cancel because had to work     Pt would like to know if he can come sat 12/18 for his blood work states needs it in order to know if medication is working for him, pt stated can not do anytime during the week due to wo

## 2021-12-13 ENCOUNTER — LAB ENCOUNTER (OUTPATIENT)
Dept: LAB | Age: 61
End: 2021-12-13
Attending: INTERNAL MEDICINE
Payer: COMMERCIAL

## 2021-12-13 ENCOUNTER — TELEPHONE (OUTPATIENT)
Dept: FAMILY MEDICINE CLINIC | Facility: CLINIC | Age: 61
End: 2021-12-13

## 2021-12-13 DIAGNOSIS — M25.562 LEFT KNEE PAIN: ICD-10-CM

## 2021-12-13 DIAGNOSIS — L40.9 PSORIASIS: Primary | ICD-10-CM

## 2021-12-13 DIAGNOSIS — M25.561 RIGHT KNEE PAIN: ICD-10-CM

## 2021-12-13 DIAGNOSIS — L40.50 PSORIATIC ARTHROPATHY (HCC): ICD-10-CM

## 2021-12-13 DIAGNOSIS — G89.4 CHRONIC PAIN SYNDROME: ICD-10-CM

## 2021-12-13 DIAGNOSIS — M89.49 HAGNER'S DISEASE: ICD-10-CM

## 2021-12-13 PROCEDURE — 85652 RBC SED RATE AUTOMATED: CPT

## 2021-12-13 PROCEDURE — 80053 COMPREHEN METABOLIC PANEL: CPT

## 2021-12-13 PROCEDURE — 85025 COMPLETE CBC W/AUTO DIFF WBC: CPT

## 2021-12-13 PROCEDURE — 36415 COLL VENOUS BLD VENIPUNCTURE: CPT

## 2021-12-13 RX ORDER — LEVOTHYROXINE SODIUM 112 UG/1
112 TABLET ORAL
Qty: 90 TABLET | Refills: 0 | Status: SHIPPED | OUTPATIENT
Start: 2021-12-13

## 2021-12-13 NOTE — TELEPHONE ENCOUNTER
Patient notified and verbalized understanding.    Requests script sent to [facility] sent

## 2021-12-13 NOTE — TELEPHONE ENCOUNTER
----- Message from Kat Victor MD sent at 12/12/2021 12:44 PM CST -----  Please let patient or caregiver know or leave message that:   His thyroid function is still a bit over active as evidenced by his TSH being overly suppressed.  We need to lower Levo

## 2021-12-17 ENCOUNTER — MED REC SCAN ONLY (OUTPATIENT)
Dept: FAMILY MEDICINE CLINIC | Facility: CLINIC | Age: 61
End: 2021-12-17

## 2022-01-04 ENCOUNTER — MED REC SCAN ONLY (OUTPATIENT)
Dept: FAMILY MEDICINE CLINIC | Facility: CLINIC | Age: 62
End: 2022-01-04

## 2022-01-04 NOTE — PROGRESS NOTES
COVID booster vaccine information received from [facility]'s. Copy sent to scanning. Vaccine information in immunization flow sheet.

## 2022-01-11 ENCOUNTER — TELEPHONE (OUTPATIENT)
Dept: FAMILY MEDICINE CLINIC | Facility: CLINIC | Age: 62
End: 2022-01-11

## 2022-01-27 ENCOUNTER — TELEPHONE (OUTPATIENT)
Dept: FAMILY MEDICINE CLINIC | Facility: CLINIC | Age: 62
End: 2022-01-27

## 2022-01-27 RX ORDER — CITALOPRAM 20 MG/1
20 TABLET ORAL DAILY
Qty: 90 TABLET | Refills: 0 | Status: SHIPPED | OUTPATIENT
Start: 2022-01-27

## 2022-01-27 NOTE — TELEPHONE ENCOUNTER
Citalopram Hydrobromide 20 MG Oral Tab        PT WOULD REFILL SENT TO   [facility] [address] [place] [address].  [phone], [phone]

## 2022-01-28 ENCOUNTER — APPOINTMENT (OUTPATIENT)
Dept: ORTHOPEDICS | Age: 62
End: 2022-01-28

## 2022-02-08 ENCOUNTER — OFFICE VISIT (OUTPATIENT)
Dept: FAMILY MEDICINE CLINIC | Facility: CLINIC | Age: 62
End: 2022-02-08
Payer: COMMERCIAL

## 2022-02-08 VITALS
OXYGEN SATURATION: 98 % | BODY MASS INDEX: 30.26 KG/M2 | WEIGHT: 221 LBS | HEIGHT: 71.5 IN | HEART RATE: 70 BPM | DIASTOLIC BLOOD PRESSURE: 82 MMHG | SYSTOLIC BLOOD PRESSURE: 122 MMHG | RESPIRATION RATE: 17 BRPM | TEMPERATURE: 98 F

## 2022-02-08 DIAGNOSIS — Z12.5 PROSTATE CANCER SCREENING: ICD-10-CM

## 2022-02-08 DIAGNOSIS — Z00.00 WELL ADULT EXAM: Primary | ICD-10-CM

## 2022-02-08 DIAGNOSIS — E03.9 HYPOTHYROIDISM, UNSPECIFIED TYPE: ICD-10-CM

## 2022-02-08 DIAGNOSIS — E78.5 HYPERLIPIDEMIA, UNSPECIFIED HYPERLIPIDEMIA TYPE: ICD-10-CM

## 2022-02-08 PROCEDURE — 3079F DIAST BP 80-89 MM HG: CPT | Performed by: FAMILY MEDICINE

## 2022-02-08 PROCEDURE — 3074F SYST BP LT 130 MM HG: CPT | Performed by: FAMILY MEDICINE

## 2022-02-08 PROCEDURE — 99396 PREV VISIT EST AGE 40-64: CPT | Performed by: FAMILY MEDICINE

## 2022-02-08 PROCEDURE — 3008F BODY MASS INDEX DOCD: CPT | Performed by: FAMILY MEDICINE

## 2022-02-08 RX ORDER — ADALIMUMAB 40MG/0.4ML
KIT SUBCUTANEOUS
COMMUNITY
Start: 2021-09-10 | End: 2022-02-08

## 2022-02-08 RX ORDER — FOLIC ACID 1 MG/1
1 TABLET ORAL DAILY
Refills: 0 | COMMUNITY

## 2022-02-09 LAB
CHOL/HDLC RATIO: 3.3 (CALC)
CHOLESTEROL, TOTAL: 147 MG/DL
HDL CHOLESTEROL: 44 MG/DL
LDL-CHOLESTEROL: 86 MG/DL (CALC)
NON-HDL CHOLESTEROL: 103 MG/DL (CALC)
PSA, TOTAL: 1.75 NG/ML
T4, FREE: 1.3 NG/DL (ref 0.8–1.8)
TRIGLYCERIDES: 77 MG/DL
TSH: 0.01 MIU/L (ref 0.4–4.5)

## 2022-02-21 RX ORDER — ATORVASTATIN CALCIUM 20 MG/1
TABLET, FILM COATED ORAL
Qty: 90 TABLET | Refills: 1 | Status: SHIPPED | OUTPATIENT
Start: 2022-02-21

## 2022-03-11 RX ORDER — LEVOTHYROXINE SODIUM 112 UG/1
TABLET ORAL
Qty: 90 TABLET | Refills: 1 | Status: SHIPPED | OUTPATIENT
Start: 2022-03-11

## 2022-04-25 RX ORDER — CITALOPRAM 20 MG/1
20 TABLET ORAL DAILY
Qty: 90 TABLET | Refills: 2 | Status: SHIPPED | OUTPATIENT
Start: 2022-04-25

## 2022-04-25 NOTE — TELEPHONE ENCOUNTER
No refill protocol for this medication. Last refill: ***  Last Visit: 2/08/2022  Next Visit: No future appointments. Forward to Dr. Kallie Galaviz please advise on refills. Thanks.

## 2022-06-08 ENCOUNTER — TELEPHONE (OUTPATIENT)
Dept: FAMILY MEDICINE CLINIC | Facility: CLINIC | Age: 62
End: 2022-06-08

## 2022-06-08 DIAGNOSIS — E03.9 HYPOTHYROIDISM, UNSPECIFIED TYPE: Primary | ICD-10-CM

## 2022-06-08 RX ORDER — CALCIPOTRIENE 50 UG/G
OINTMENT TOPICAL
COMMUNITY
Start: 2022-05-18

## 2022-06-08 NOTE — TELEPHONE ENCOUNTER
Patient requests order for TSH to be changed to [facility]    He will be going to the [facility] [address] in [place]    Thank you

## 2022-06-08 NOTE — TELEPHONE ENCOUNTER
Please let patient or caregiver know or leave message that:   Orders for thyroid blood tests have been sent to [facility].  Thanks

## 2022-06-15 LAB
T3, FREE: 3.8 PG/ML (ref 2.3–4.2)
T4, FREE: 1.2 NG/DL (ref 0.8–1.8)
TSH: 0.01 MIU/L (ref 0.4–4.5)

## 2022-08-29 RX ORDER — ATORVASTATIN CALCIUM 20 MG/1
TABLET, FILM COATED ORAL
Qty: 90 TABLET | Refills: 0 | Status: SHIPPED | OUTPATIENT
Start: 2022-08-29

## 2022-09-07 RX ORDER — LEVOTHYROXINE SODIUM 112 UG/1
112 TABLET ORAL
Qty: 90 TABLET | Refills: 1 | Status: SHIPPED | OUTPATIENT
Start: 2022-09-07 | End: 2023-03-06

## 2022-12-03 RX ORDER — ATORVASTATIN CALCIUM 20 MG/1
20 TABLET, FILM COATED ORAL NIGHTLY
Qty: 90 TABLET | Refills: 0 | Status: SHIPPED | OUTPATIENT
Start: 2022-12-03

## 2022-12-03 NOTE — TELEPHONE ENCOUNTER
ATORVASTATIN 20 MG Oral Tab    [address]  [phone], [phone]    Pt calling would like a refill on medication verified pharmacy     Thank you,

## 2022-12-03 NOTE — TELEPHONE ENCOUNTER
Last refilled on 8/29/22 for # 90 with 0 refills  Last OV 2/8/22  Future Appointments   Date Time Provider Pierre Dukes   2/9/2023  8:00 AM Faustino Stephenson MD [facility] [address]        Thank you.

## 2023-01-05 ENCOUNTER — PATIENT MESSAGE (OUTPATIENT)
Dept: FAMILY MEDICINE CLINIC | Facility: CLINIC | Age: 63
End: 2023-01-05

## 2023-01-05 DIAGNOSIS — Z00.00 WELL ADULT EXAM: Primary | ICD-10-CM

## 2023-01-05 DIAGNOSIS — E03.9 HYPOTHYROIDISM, UNSPECIFIED TYPE: ICD-10-CM

## 2023-01-05 DIAGNOSIS — Z12.5 PROSTATE CANCER SCREENING: ICD-10-CM

## 2023-01-05 DIAGNOSIS — E78.5 HYPERLIPIDEMIA, UNSPECIFIED HYPERLIPIDEMIA TYPE: ICD-10-CM

## 2023-01-06 NOTE — TELEPHONE ENCOUNTER
From: Georgette Werner  To: Chris Verdugo MD  Sent: 1/5/2023 6:13 PM CST  Subject: Rehanashanda Herrera,  My annual visit is in February but could you order Thyroid labs now? As you know, my thyroid has been up and down in the past and I've been feeling sluggish lately. Thought it might be a good time to check it out. I've been using [facility] in the [place]. Let me know your thoughts. Thanks and Happy New Year!     [employer]

## 2023-01-11 LAB
ALT: 18 U/L (ref 9–46)
AST: 21 U/L (ref 10–35)
BUN: 14 MG/DL (ref 7–25)
CALCIUM: 9 MG/DL (ref 8.6–10.3)
CARBON DIOXIDE: 28 MMOL/L (ref 20–32)
CHLORIDE: 106 MMOL/L (ref 98–110)
CHOL/HDLC RATIO: 3.4 (CALC)
CHOLESTEROL, TOTAL: 147 MG/DL
CREATININE: 0.83 MG/DL (ref 0.7–1.35)
EGFR: 100 ML/MIN/1.73M2
GLUCOSE: 85 MG/DL (ref 65–99)
HDL CHOLESTEROL: 43 MG/DL
HEMATOCRIT: 48.3 % (ref 38.5–50)
HEMOGLOBIN: 16.4 G/DL (ref 13.2–17.1)
LDL-CHOLESTEROL: 89 MG/DL (CALC)
MCH: 31.6 PG (ref 27–33)
MCHC: 34 G/DL (ref 32–36)
MCV: 93.1 FL (ref 80–100)
MPV: 10.3 FL (ref 7.5–12.5)
NON-HDL CHOLESTEROL: 104 MG/DL (CALC)
PLATELET COUNT: 186 THOUSAND/UL (ref 140–400)
POTASSIUM: 4.2 MMOL/L (ref 3.5–5.3)
PSA, TOTAL: 1.59 NG/ML
RDW: 12.8 % (ref 11–15)
RED BLOOD CELL COUNT: 5.19 MILLION/UL (ref 4.2–5.8)
SODIUM: 141 MMOL/L (ref 135–146)
T3, FREE: 3.7 PG/ML (ref 2.3–4.2)
T4, FREE: 1.2 NG/DL (ref 0.8–1.8)
TRIGLYCERIDES: 67 MG/DL
TSH: 0.05 MIU/L (ref 0.4–4.5)
WHITE BLOOD CELL COUNT: 5.5 THOUSAND/UL (ref 3.8–10.8)

## 2023-01-23 RX ORDER — CITALOPRAM 20 MG/1
TABLET ORAL
Qty: 90 TABLET | Refills: 0 | Status: SHIPPED | OUTPATIENT
Start: 2023-01-23

## 2023-02-09 ENCOUNTER — TELEPHONE (OUTPATIENT)
Dept: FAMILY MEDICINE CLINIC | Facility: CLINIC | Age: 63
End: 2023-02-09

## 2023-02-09 ENCOUNTER — OFFICE VISIT (OUTPATIENT)
Dept: FAMILY MEDICINE CLINIC | Facility: CLINIC | Age: 63
End: 2023-02-09
Payer: COMMERCIAL

## 2023-02-09 VITALS
HEART RATE: 72 BPM | RESPIRATION RATE: 16 BRPM | SYSTOLIC BLOOD PRESSURE: 110 MMHG | WEIGHT: 232.38 LBS | TEMPERATURE: 98 F | OXYGEN SATURATION: 95 % | DIASTOLIC BLOOD PRESSURE: 70 MMHG | BODY MASS INDEX: 32 KG/M2

## 2023-02-09 DIAGNOSIS — E03.9 HYPOTHYROIDISM, UNSPECIFIED TYPE: ICD-10-CM

## 2023-02-09 DIAGNOSIS — E78.5 HYPERLIPIDEMIA, UNSPECIFIED HYPERLIPIDEMIA TYPE: ICD-10-CM

## 2023-02-09 DIAGNOSIS — Z00.00 WELL ADULT EXAM: Primary | ICD-10-CM

## 2023-02-09 DIAGNOSIS — F32.A DEPRESSION, UNSPECIFIED DEPRESSION TYPE: ICD-10-CM

## 2023-02-09 DIAGNOSIS — Z12.11 COLON CANCER SCREENING: ICD-10-CM

## 2023-02-09 PROCEDURE — 3074F SYST BP LT 130 MM HG: CPT | Performed by: FAMILY MEDICINE

## 2023-02-09 PROCEDURE — 3078F DIAST BP <80 MM HG: CPT | Performed by: FAMILY MEDICINE

## 2023-02-09 PROCEDURE — 99396 PREV VISIT EST AGE 40-64: CPT | Performed by: FAMILY MEDICINE

## 2023-02-09 RX ORDER — CELECOXIB 200 MG/1
200 CAPSULE ORAL DAILY
Refills: 0 | COMMUNITY
Start: 2023-02-09

## 2023-12-06 ENCOUNTER — PATIENT MESSAGE (OUTPATIENT)
Dept: FAMILY MEDICINE CLINIC | Facility: CLINIC | Age: 63
End: 2023-12-06

## 2023-12-13 NOTE — TELEPHONE ENCOUNTER
From: Georgette Werner  To: Chris Verdugo  Sent: 12/6/2023 8:13 AM CST  Subject: Azell Litter Issue    Hey Doc, for the past month I have been having a bowel movement issue. Previously, I was regular and normal. Now, the bowel is very soft, darker in color and has a bad odor. It is not diarrhea. Seems like I get the urge to go more during the night which is unusual for me. I have no pain and there is no blood. I do take a probiotic which normally works great. I'm concerned there may be a bacterial issue or something going on. Should I try changing the probiotic or is there something you can prescribe?   Thanks,   Jewel Gasca

## 2024-01-22 RX ORDER — CITALOPRAM 20 MG/1
20 TABLET ORAL DAILY
Qty: 90 TABLET | Refills: 0 | Status: SHIPPED | OUTPATIENT
Start: 2024-01-22

## 2024-01-22 NOTE — TELEPHONE ENCOUNTER
Please let patient or caregiver know or leave message that refill request for the medication/s listed below has/have come to our office. The refills have been sent.    It appears he is overdue for colonoscopy.  He could see the previous GI:    Alfonso Li MD   [address] [place] [address]   [phone] (Fax)     Or     [facility]ard# [phone]     Thanks     Requested Prescriptions     Signed Prescriptions Disp Refills    citalopram 20 MG Oral Tab 90 tablet 0     Sig: Take 1 tablet (20 mg total) by mouth daily.     Authorizing Provider: KARLA OROSCO

## 2024-02-14 ENCOUNTER — OFFICE VISIT (OUTPATIENT)
Dept: FAMILY MEDICINE CLINIC | Facility: CLINIC | Age: 64
End: 2024-02-14
Payer: COMMERCIAL

## 2024-02-14 VITALS
WEIGHT: 227 LBS | HEART RATE: 74 BPM | BODY MASS INDEX: 31 KG/M2 | SYSTOLIC BLOOD PRESSURE: 112 MMHG | RESPIRATION RATE: 16 BRPM | OXYGEN SATURATION: 98 % | TEMPERATURE: 98 F | DIASTOLIC BLOOD PRESSURE: 70 MMHG

## 2024-02-14 DIAGNOSIS — G47.30 SLEEP APNEA, UNSPECIFIED TYPE: ICD-10-CM

## 2024-02-14 DIAGNOSIS — E03.9 HYPOTHYROIDISM, UNSPECIFIED TYPE: ICD-10-CM

## 2024-02-14 DIAGNOSIS — E78.5 HYPERLIPIDEMIA, UNSPECIFIED HYPERLIPIDEMIA TYPE: ICD-10-CM

## 2024-02-14 DIAGNOSIS — L40.50 PSORIATIC ARTHRITIS (HCC): ICD-10-CM

## 2024-02-14 DIAGNOSIS — F41.8 DEPRESSION WITH ANXIETY: ICD-10-CM

## 2024-02-14 DIAGNOSIS — Z00.00 WELL ADULT EXAM: Primary | ICD-10-CM

## 2024-02-14 PROBLEM — R76.8 POSITIVE ANA (ANTINUCLEAR ANTIBODY): Status: RESOLVED | Noted: 2018-01-25 | Resolved: 2024-02-14

## 2024-02-14 PROCEDURE — 3074F SYST BP LT 130 MM HG: CPT | Performed by: FAMILY MEDICINE

## 2024-02-14 PROCEDURE — 3078F DIAST BP <80 MM HG: CPT | Performed by: FAMILY MEDICINE

## 2024-02-14 PROCEDURE — 99396 PREV VISIT EST AGE 40-64: CPT | Performed by: FAMILY MEDICINE

## 2024-02-14 RX ORDER — CITALOPRAM 20 MG/1
40 TABLET ORAL DAILY
COMMUNITY
Start: 2024-02-14

## 2024-02-14 NOTE — PROGRESS NOTES
Mendoza Werner is a 63 year old male.    CC:    Chief Complaint   Patient presents with    Physical       HPI:  Yearly PX    Last PSA:   Recent Results (from the past 277701 hour(s))   PSA Total, Diagnostic    Collection Time: 01/11/24  7:29 AM   Result Value Ref Range    PSA, TOTAL 1.76 < OR = 4.00 ng/mL     *Note: Due to a large number of results and/or encounters for the requested time period, some results have not been displayed. A complete set of results can be found in Results Review.        Last lipid:  Lab Results   Component Value Date    CHOLEST 152 01/11/2024    TRIG 73 01/11/2024    HDL 43 01/11/2024    LDL 93 01/11/2024    VLDL 21 01/29/2021    TCHDLRATIO 3.5 01/11/2024    NONHDLC 109 01/11/2024       Last Colon Cancer screening: Colonoscopy 11/28/2018 to be repeated 11/2023. Has scheduled for 4/2024      Immunization History   Administered Date(s) Administered    Covid-19 Vaccine Pfizer 30 mcg/0.3 ml 02/06/2021, 02/27/2021, 12/30/2021    FLULAVAL 6 months & older 0.5 ml Prefilled syringe (19526) 10/01/2020    FLUZONE 6 months and older PFS 0.5 ml (72088) 10/01/2020    Influenza 01/23/2013, 01/25/2014    TDAP 01/23/2009, 01/23/2019    Td 07/11/1996    Zoster Vaccine Live (Zostavax) 07/24/2013       Patient Active Problem List   Diagnosis            Depression/Anxiety: SSRI use. Under more stress this year due to family stressors        Psoriatic arthritis (HCC): Has Dermatologist and Rheumatologist. Currently not on disease modifying meds    Hyperlipidemia: Lipitor working well, labs above   MJ: Old CPAP, not sure it is working well, has not seen sleep specialist in many years    Hypothyroidism: Levothyroxine  Thyroid:    Lab Results   Component Value Date    TSH 0.04 (L) 01/11/2024    TSH 0.05 (L) 01/10/2023    TSH 0.01 (L) 06/14/2022    T4F 1.2 01/11/2024    T4F 1.2 01/10/2023    T4F 1.2 06/14/2022          Allergies:  Allergies   Allergen Reactions    Augmentin [Amoxicillin-Pot Clavulanate] RASH     Etodolac DIZZINESS and NAUSEA ONLY     Hearing loss, lightheadedness    Simvastatin MYALGIA     Other reaction(s): Muscle/Joint Ache      Current Meds:  Current Outpatient Medications   Medication Sig Dispense Refill    citalopram 20 MG Oral Tab Take 1 tablet (20 mg total) by mouth daily. 90 tablet 0    levothyroxine 112 MCG Oral Tab Take 1 tablet (112 mcg total) by mouth before breakfast. 90 tablet 3    atorvastatin 20 MG Oral Tab Take 1 tablet (20 mg total) by mouth nightly. 90 tablet 3    diclofenac (VOLTAREN) 1 % External Gel Take 100 g by mouth As Directed.      Calcipotriene 0.005 % External Ointment APPLY OINTMENT TOPICALLY TO AFFECTED AREAS OF HANDS AND BODY TWICE DAILY MONDAY THROUGH FRIDAY EVENING WHEN NOT USING CLOBETASOL      Clobetasol Propionate 0.05 % External Ointment Apply to AAs of hands and body BID x 2 weeks, then on weekends only 60 g 3    Ergocalciferol (VITAMIN D OR) Take  by mouth daily.          History:  Past Medical History:   Diagnosis Date    Anxiety     Arthritis of right knee     Arthritis, lumbar spine     Bilateral shoulder region arthritis     Depression     Food intolerance     lactose issues    GERD (gastroesophageal reflux disease)     From [facility]    HLD (hyperlipidemia)     Hypothyroid     MJ on CPAP     Positive BENITA (antinuclear antibody)     Psoriasis     From [facility] records    Psoriatic arthritis (HCC)     Right bundle branch block (RBBB) 01/23/2017    Noted on EKG from [facility] note dated 4/27/04      Past Surgical History:   Procedure Laterality Date    COLONOSCOPY      OTHER SURGICAL HISTORY      Retinal detatchment    VASECTOMY      From [facility] records      Family History   Problem Relation Age of Onset    Stroke Mother     Heart Disease Father     Stroke Maternal Grandmother       Family Status   Relation Status    Mo Alive    Fa Alive    Bro Alive    MGMA (Not Specified)      Social History     Socioeconomic History    Marital status:    Tobacco Use     Smoking status: Never    Smokeless tobacco: Never   Vaping Use    Vaping Use: Never used   Substance and Sexual Activity    Alcohol use: Yes     Comment: on a social basis    Drug use: No        ROS:  General: energy level stable      Vitals: /70 (BP Location: Left arm, Patient Position: Sitting, Cuff Size: adult)   Pulse 74   Temp 97.5 °F (36.4 °C)   Resp 16   Wt 227 lb (103 kg)   SpO2 98%   BMI 30.79 kg/m²    Reviewed by MARQUISE Molina M.D.  Wt Readings from Last 3 Encounters:   02/14/24 227 lb (103 kg)   02/05/24 225 lb 6.4 oz (102.2 kg)   02/09/23 232 lb 6.4 oz (105.4 kg)       Physical Exam:  GEN: well developed, well nourished, in no apparent distress  EYE: B conjunctiva and lids normal  HENT: normocephalic; normal nose, pharynx and TM's  NECK: No lymphadenopathy, thyromegaly or masses  CAR: S1, S2 normal, RRR; no S3, no S4; no click; murmur negative  PULM: clear to auscultation B, no accessory muscle use  GI: normal active BS+, soft, nondistended; no HSM; no masses; no bruits; no masses; nontender, no G/R/R   PSYCH: alert and oriented x 3; affect appropriate  SKIN: not examined  BREAST: not examined/not applicable  EXTREMITIES: No clubbing, cyanosis or edema  RECTAL: not examined  GENITAL: not examined  LYMPH: no supraclavicular nodes  MUSCULOSKELETAL: normal ambulation  NEURO: Awake and alert. Normal speech and articulation. No facial droop or asymmetry. Moving all extremities equally. Symmetric B patellar DTRs    ASSESSMENT AND PLAN    1. Well adult exam  Keep appt with Colonoscopy    2. Sleep apnea, unspecified type    - Sleep Medicine - [facility]    3. Psoriatic arthritis (HCC)  Care per Rheum and Derma    4. Hyperlipidemia, unspecified hyperlipidemia type  Strable   Continue Lipitor    5. Hypothyroidism, unspecified type  Stable   Continue Levothyroxine     6. Depression with anxiety  Increase SSRI dose  He is to [facility] and update in 4 weeks  - citalopram 20 MG Oral Tab; Take 2  tablets (40 mg total) by mouth daily.      No follow-ups on file.    Orders for this visit:    No orders of the defined types were placed in this encounter.      SLEEP MEDICINE - INTERNAL    Meds & Refills for this Visit:  Requested Prescriptions      No prescriptions requested or ordered in this encounter             Authorized by Sunny Molina M.D.

## 2024-03-10 DIAGNOSIS — F41.8 DEPRESSION WITH ANXIETY: ICD-10-CM

## 2024-03-10 RX ORDER — CITALOPRAM 20 MG/1
40 TABLET ORAL DAILY
Refills: 0 | Status: CANCELLED | OUTPATIENT
Start: 2024-03-10

## 2024-03-11 RX ORDER — ATORVASTATIN CALCIUM 20 MG/1
20 TABLET, FILM COATED ORAL NIGHTLY
Qty: 90 TABLET | Refills: 3 | Status: SHIPPED | OUTPATIENT
Start: 2024-03-11

## 2024-03-11 RX ORDER — CITALOPRAM 40 MG/1
40 TABLET ORAL DAILY
Qty: 90 TABLET | Refills: 3 | Status: SHIPPED | OUTPATIENT
Start: 2024-03-11

## 2024-03-11 RX ORDER — LEVOTHYROXINE SODIUM 112 UG/1
112 TABLET ORAL
Qty: 90 TABLET | Refills: 3 | Status: SHIPPED | OUTPATIENT
Start: 2024-03-11

## 2024-05-13 ENCOUNTER — PATIENT MESSAGE (OUTPATIENT)
Dept: FAMILY MEDICINE CLINIC | Facility: CLINIC | Age: 64
End: 2024-05-13

## 2024-05-14 NOTE — TELEPHONE ENCOUNTER
From: Mendoza Werner  To: Sunny Almontelynda  Sent: 5/13/2024 7:48 PM CDT  Subject: Dermatologist Recommendation    Doc, in short, Last Thursday I noticed that I started getting some spots similar to that of psoriasis on my torso. I've had a mild case of psoriasis on my legs for quite some time and have seen a Derm for that. It is not itching. Today I called my Dr Mireille Berg, Dr Florinda Arechiga, and they can't get me in for 2 months as I spoke with a rep at the centralized call center. She recommended I contact my primary for advice. Can you recommend any good Derms that I could possibly get into soon? I'm trying to avoid it spreading any more than it already has. Thank you!

## 2025-02-04 ENCOUNTER — OFFICE VISIT (OUTPATIENT)
Facility: CLINIC | Age: 65
End: 2025-02-04
Payer: COMMERCIAL

## 2025-02-04 VITALS
DIASTOLIC BLOOD PRESSURE: 74 MMHG | WEIGHT: 227 LBS | HEIGHT: 72 IN | BODY MASS INDEX: 30.75 KG/M2 | HEART RATE: 71 BPM | SYSTOLIC BLOOD PRESSURE: 106 MMHG | OXYGEN SATURATION: 96 % | RESPIRATION RATE: 16 BRPM

## 2025-02-04 DIAGNOSIS — I45.10 RIGHT BUNDLE BRANCH BLOCK (RBBB): Primary | ICD-10-CM

## 2025-02-04 DIAGNOSIS — G47.33 OBSTRUCTIVE SLEEP APNEA: ICD-10-CM

## 2025-02-04 DIAGNOSIS — E78.5 HYPERLIPIDEMIA, UNSPECIFIED HYPERLIPIDEMIA TYPE: ICD-10-CM

## 2025-02-04 PROCEDURE — 3078F DIAST BP <80 MM HG: CPT | Performed by: OTHER

## 2025-02-04 PROCEDURE — 3008F BODY MASS INDEX DOCD: CPT | Performed by: OTHER

## 2025-02-04 PROCEDURE — 99204 OFFICE O/P NEW MOD 45 MIN: CPT | Performed by: OTHER

## 2025-02-04 PROCEDURE — 3074F SYST BP LT 130 MM HG: CPT | Performed by: OTHER

## 2025-02-04 NOTE — PROGRESS NOTES
[facility]  SLEEP PROGRESS NOTE        HPI:   This is a 64 year old male coming in for   Chief Complaint   Patient presents with    New Patient     Pt here for sleep consult.  Pt here to establish care,  pt may need new machine and settings.         HPI:   Dx over 10 years ago with MJ  He uses cpap nightly  using a FFM    Sleeps on his sides  Benefits from regular use  He is not as tired  ESS 5/24  Usually wears around 12am    Goes to bed 930-10, SL rapid  Next wakeup at midnight ,   Wakes around 6-7am  Unemployed  In logistics    His DME [facility]      Patient: Sleep review of systems today: see form.      Pt  PCP:  Sunny Molina MD  No referring provider defined for this encounter.           No data to display                    Past Medical History:    Anxiety    Arthritis    Arthritis of right knee    Arthritis, lumbar spine    Back pain    Bilateral shoulder region arthritis    Depression    Food intolerance    lactose issues    Frequent urination    GERD (gastroesophageal reflux disease)    From [facility]    Heart palpitations    HLD (hyperlipidemia)    Hypothyroid    Irregular bowel habits    MJ on CPAP    Pain in joints    Positive BENITA (antinuclear antibody)    Psoriasis    From [facility]    Psoriatic arthritis (HCC)    Right bundle branch block (RBBB)    Noted on EKG from [facility] note dated 4/27/04    Sleep apnea    Stress    Wears glasses     Past Surgical History:   Procedure Laterality Date    Colonoscopy      Other surgical history      Retinal detatchment    Vasectomy      From [facility] records     Social History:  Social History     Social History Narrative    Not on file     Social History     Socioeconomic History    Marital status:    Tobacco Use    Smoking status: Never    Smokeless tobacco: Never   Vaping Use    Vaping status: Never Used   Substance and Sexual Activity    Alcohol use: Yes     Comment: on a social basis    Drug use: No     Social Drivers of Health       Received [facility], [facility]    Housing Stability     Family History:  Family History   Problem Relation Age of Onset    Stroke Mother     Heart Disease Father     Stroke Maternal Grandmother      Allergies:  Allergies[1]  Current Meds:  Current Outpatient Medications   Medication Sig Dispense Refill    betamethasone valerate 0.1 % External Ointment APPLY OINTMENT TO AFFECTED AREA ON THE LEGS TWICE DAILY FOR 1 TO 2 WEEKS AT A TIME, USE AS NEEDED FOR ECZEMA FLARES      levothyroxine 112 MCG Oral Tab Take 1 tablet (112 mcg total) by mouth before breakfast. 90 tablet 3    atorvastatin 20 MG Oral Tab Take 1 tablet (20 mg total) by mouth nightly. 90 tablet 3    citalopram (CELEXA) 40 MG Oral Tab Take 1 tablet (40 mg total) by mouth daily. 90 tablet 3    diclofenac (VOLTAREN) 1 % External Gel Take 100 g by mouth As Directed.      Ergocalciferol (VITAMIN D OR) Take  by mouth daily.      Calcipotriene 0.005 % External Ointment APPLY OINTMENT TOPICALLY TO AFFECTED AREAS OF HANDS AND BODY TWICE DAILY MONDAY THROUGH FRIDAY EVENING WHEN NOT USING CLOBETASOL (Patient not taking: Reported on 2/4/2025)      Clobetasol Propionate 0.05 % External Ointment Apply to AAs of hands and body BID x 2 weeks, then on weekends only (Patient not taking: Reported on 2/4/2025) 60 g 3      Counseling given: Not Answered         Problem List:  Patient Active Problem List   Diagnosis    Right bundle branch block (RBBB)    Depression    Psoriatic arthritis (HCC)    Hyperlipidemia    Hypothyroidism    Obstructive sleep apnea       REVIEW OF SYSTEMS:   Review of Systems    EXAM:   /74   Pulse 71   Resp 16   Ht 6' (1.829 m)   Wt 227 lb (103 kg)   SpO2 96%   BMI 30.79 kg/m²  Estimated body mass index is 30.79 kg/m² as calculated from the following:    Height as of this encounter: 6' (1.829 m).    Weight as of this encounter: 227 lb (103 kg).   Neck in inches:      Wt Readings from Last 6  Encounters:   02/04/25 227 lb (103 kg)   04/05/24 227 lb (103 kg)   02/14/24 227 lb (103 kg)   02/05/24 225 lb 6.4 oz (102.2 kg)   02/09/23 232 lb 6.4 oz (105.4 kg)   02/08/22 221 lb (100.2 kg)     BP Readings from Last 3 Encounters:   02/04/25 106/74   02/14/24 112/70   02/05/24 132/79     Pulse Readings from Last 3 Encounters:   02/04/25 71   02/14/24 74   02/05/24 73     SpO2 Readings from Last 3 Encounters:   02/04/25 96%   02/14/24 98%   02/09/23 95%      Ideal body weight: 77.6 kg (171 lb 1.2 oz)  Adjusted ideal body weight: 87.7 kg (193 lb 7.1 oz)    Vital signs reviewed.  Physical Exam  Malim 3  ASSESSMENT AND PLAN:   1. Right bundle branch block (RBBB)  stable  2. Hyperlipidemia, unspecified hyperlipidemia type    3. Obstructive sleep apnea  If he is able to locate prior PSG and consult, we will order his device, he will call his DME and primary.  Otherwise will need repeat split study , then will order new device.  He will contact me with decision.  There are no Patient Instructions on file for this visit.    Independent interpretation of Sleep Download as defined above.  Continue with Rx management of Sleep apnea with PAP therapy.    COMPLIANCE is required by insurance for 4 hours a night most nights of the week.    Advised if still with sleep apnea and not using CPAP has a 7 fold increase in risk of heart attack, stroke, abnormal heart rhythm  and death,  increased risk of driving accidents.     Advised to refrain from driving when sleepy.      Recommend weight loss, and maintain and optimal BMI with Exercise 30 minutes most days to target heart rate .     Advised patient to change filters,masks,hoses  and tubes and equiptment on a  regular schedule.    Filters and seals shall be changed every 1 month,  Hoses every 3 months,   CPAP mask and humidifier chamber changed every 6 month  with the durable medical equipment provider.         Meds & Refills for this Visit:  Requested Prescriptions      No  prescriptions requested or ordered in this encounter       Outcome: Parent verbalizes understanding. Parent is notified to call with any questions, complications, allergies, or worsening or changing symptoms.  Parent is to call with any side effects or complications from the treatments as a result of today.     \" This note was created utilizing Dragon speech recognition software.  Please excuse any grammatical errors. Call my office if you have any questions regarding this note. \"     Dany Joseph,   2/4/2025  9:45 AM         [1]   Allergies  Allergen Reactions    Augmentin [Amoxicillin-Pot Clavulanate] RASH    Etodolac DIZZINESS and NAUSEA ONLY     Hearing loss, lightheadedness    Simvastatin MYALGIA     Other reaction(s): Muscle/Joint Ache

## 2025-02-06 ENCOUNTER — PATIENT MESSAGE (OUTPATIENT)
Facility: CLINIC | Age: 65
End: 2025-02-06

## 2025-02-12 ENCOUNTER — MED REC SCAN ONLY (OUTPATIENT)
Dept: FAMILY MEDICINE CLINIC | Facility: CLINIC | Age: 65
End: 2025-02-12

## 2025-02-20 ENCOUNTER — OFFICE VISIT (OUTPATIENT)
Dept: FAMILY MEDICINE CLINIC | Facility: CLINIC | Age: 65
End: 2025-02-20
Payer: COMMERCIAL

## 2025-02-20 VITALS
TEMPERATURE: 97 F | OXYGEN SATURATION: 97 % | DIASTOLIC BLOOD PRESSURE: 80 MMHG | BODY MASS INDEX: 30.61 KG/M2 | HEART RATE: 73 BPM | WEIGHT: 226 LBS | HEIGHT: 72 IN | SYSTOLIC BLOOD PRESSURE: 104 MMHG

## 2025-02-20 DIAGNOSIS — G47.33 OBSTRUCTIVE SLEEP APNEA: ICD-10-CM

## 2025-02-20 DIAGNOSIS — E03.9 HYPOTHYROIDISM, UNSPECIFIED TYPE: ICD-10-CM

## 2025-02-20 DIAGNOSIS — M54.50 LUMBAR PAIN: ICD-10-CM

## 2025-02-20 DIAGNOSIS — E78.5 HYPERLIPIDEMIA, UNSPECIFIED HYPERLIPIDEMIA TYPE: ICD-10-CM

## 2025-02-20 DIAGNOSIS — F32.A DEPRESSION, UNSPECIFIED DEPRESSION TYPE: ICD-10-CM

## 2025-02-20 DIAGNOSIS — Z12.5 PROSTATE CANCER SCREENING: ICD-10-CM

## 2025-02-20 DIAGNOSIS — Z00.00 WELL ADULT EXAM: Primary | ICD-10-CM

## 2025-02-20 DIAGNOSIS — I45.10 RIGHT BUNDLE BRANCH BLOCK (RBBB): ICD-10-CM

## 2025-02-20 DIAGNOSIS — L40.50 PSORIATIC ARTHRITIS (HCC): ICD-10-CM

## 2025-02-20 PROCEDURE — 3079F DIAST BP 80-89 MM HG: CPT | Performed by: FAMILY MEDICINE

## 2025-02-20 PROCEDURE — 3074F SYST BP LT 130 MM HG: CPT | Performed by: FAMILY MEDICINE

## 2025-02-20 PROCEDURE — 3008F BODY MASS INDEX DOCD: CPT | Performed by: FAMILY MEDICINE

## 2025-02-20 PROCEDURE — 99396 PREV VISIT EST AGE 40-64: CPT | Performed by: FAMILY MEDICINE

## 2025-02-20 RX ORDER — CYCLOBENZAPRINE HCL 10 MG
10 TABLET ORAL 3 TIMES DAILY PRN
Qty: 30 TABLET | Refills: 0 | Status: SHIPPED | OUTPATIENT
Start: 2025-02-20 | End: 2025-03-12

## 2025-02-20 NOTE — PROGRESS NOTES
Mendoza Werner is a 64 year old male.    CC:  CPX    HPI:  Yearly PX    Last PSA:   Recent Results (from the past 210084 hours)   PSA Total, Diagnostic    Collection Time: 01/11/24  7:29 AM   Result Value Ref Range    PSA, TOTAL 1.76 < OR = 4.00 ng/mL     *Note: Due to a large number of results and/or encounters for the requested time period, some results have not been displayed. A complete set of results can be found in Results Review.        Last lipid:  Lab Results   Component Value Date    CHOLEST 152 01/11/2024    TRIG 73 01/11/2024    HDL 43 01/11/2024    LDL 93 01/11/2024    VLDL 21 01/29/2021    TCHDLRATIO 3.5 01/11/2024    NONHDLC 109 01/11/2024       Last Colon Cancer screening: Colonoscopy  11/29/2018 to be repeated 2023. Has colonoscopy scheduled for next month      Immunization History   Administered Date(s) Administered    Covid-19 Vaccine Pfizer 30 mcg/0.3 ml 02/06/2021, 02/27/2021, 12/30/2021    FLULAVAL 6 months & older 0.5 ml Prefilled syringe (23966) 10/01/2020    FLUZONE 6 months and older PFS 0.5 ml (70255) 10/01/2020    Influenza 01/23/2013, 01/25/2014    TDAP 01/23/2009, 01/23/2019    Td 07/11/1996    Zoster Vaccine Live (Zostavax) 07/24/2013       Patient Active Problem List   Diagnosis    Right bundle branch block (RBBB): no interventions    Depression: Celexa mood doing fine. Did lose his job in the past year and is coping well.     Psoriatic arthritis (HCC): Sees Dermatology and Rheumatology, no disease modifying meds currently    Hyperlipidemia: statin, due for labs     Hypothyroidism: Levothyroxine, due for labs     Obstructive sleep apnea: CPAP, sees Sleep Med      Has nocturia x 3 and urinary urgency. No hematuria. Will be seeing Urology in the next few months to evaluate    R lower back pain for one week. Can radiate to the R teste. Does not go down the leg. NO bowel/bladder incontinence. Started after doing heavy lifting. Stretching of some help    Allergies as of 02/20/2025 -  Review Complete 02/20/2025   Allergen Reaction Noted    Augmentin [amoxicillin-pot clavulanate] RASH 11/23/2016    Etodolac DIZZINESS and NAUSEA ONLY 04/01/2019    Simvastatin MYALGIA 01/22/2011        Current Meds:  Current Outpatient Medications   Medication Sig Dispense Refill    OPZELURA 1.5 % External Cream Apply BID to affected areas      levothyroxine 112 MCG Oral Tab Take 1 tablet (112 mcg total) by mouth before breakfast. 90 tablet 3    atorvastatin 20 MG Oral Tab Take 1 tablet (20 mg total) by mouth nightly. 90 tablet 3    citalopram (CELEXA) 40 MG Oral Tab Take 1 tablet (40 mg total) by mouth daily. 90 tablet 3    diclofenac (VOLTAREN) 1 % External Gel Take 100 g by mouth As Directed.      Ergocalciferol (VITAMIN D OR) Take  by mouth daily.          History:  Past Medical History:    Anxiety    Arthritis of right knee    Arthritis, lumbar spine    Bilateral shoulder region arthritis    Depression    GERD (gastroesophageal reflux disease)    From [facility]    HLD (hyperlipidemia)    Hypothyroid    MJ on CPAP    Positive BENITA (antinuclear antibody)    Psoriasis    From [facility]    Psoriatic arthritis (HCC)    Right bundle branch block (RBBB)    Noted on EKG from [facility] note dated 4/27/04    Sleep apnea      Past Surgical History:   Procedure Laterality Date    Colonoscopy      Other surgical history      Retinal detatchment    Vasectomy      From [facility] records      Family History   Problem Relation Age of Onset    Stroke Mother     Heart Disease Father     Stroke Maternal Grandmother       Family Status   Relation Status    Mo Alive    Fa Alive    Bro Alive    MGMA (Not Specified)      Social History     Socioeconomic History    Marital status:    Tobacco Use    Smoking status: Never    Smokeless tobacco: Never   Vaping Use    Vaping status: Never Used   Substance and Sexual Activity    Alcohol use: Yes     Comment: on a social basis    Drug use: No     Social Drivers of Health       [facility]    Housing Stability        ROS:  General: energy level stable, no fevers  Cardiovascular/Pulses: Denies exertional chest pain or pressure  GI: Denies hematochezia   (male): Denies hematuria    Vitals: /80   Pulse 73   Temp 97 °F (36.1 °C) (Temporal)   Ht 6' (1.829 m)   Wt 226 lb (102.5 kg)   SpO2 97%   BMI 30.65 kg/m²    Reviewed by MARQUISE Molina M.D.    Physical Exam:  GEN: well developed, well nourished, in no apparent distress  EYE: B conjunctiva and lids normal  HENT: normocephalic; normal nose, pharynx and TM's  NECK: No lymphadenopathy, thyromegaly or masses  CAR: S1, S2 normal, RRR; no S3, no S4; no click; murmur negative  PULM: clear to auscultation B, no accessory muscle use  GI: normal active BS+, soft, nondistended; no HSM; no masses; no bruits; no masses; nontender, no G/R/R   PSYCH: alert and oriented x 3; affect appropriate  SKIN: no rashes, no suspicious lesions on low back, psoriatic lesions noted on R leg  EXTREMITIES: No clubbing, cyanosis or edema  GENITAL: penis normal, no testicular masses, no inguinal hernia  LYMPH: no supraclavicular nodes  NEURO: Awake and alert. Normal speech and articulation. No facial droop or asymmetry. Moving all extremities equally. Symmetric B patellar DTRs and B Williams's DTRs, + R SLR  MS: mild lower lumbar spine tenderness, + R lower para lumbar spasm and mild tenderness    ASSESSMENT AND PLAN    1. Well adult exam  Await results   Colonoscopy set for 3/2025    - ALT(SGPT)  - AST (SGOT)  - PSA Total, Diagnostic  - Lipid Panel  - CBC, Platelet; No Differential  - Basic Metabolic Panel (8)  - Assay, Thyroid Stim Hormone  - Free T4, (Free Thyroxine)    2. Psoriatic arthritis (HCC)  Care per [facility]    3. Right bundle branch block (RBBB)  No intervention needed    4. Hyperlipidemia, unspecified hyperlipidemia type  Continue statin  Await results   - ALT(SGPT)  - AST (SGOT)  -  Lipid Panel    5. Hypothyroidism, unspecified type  Continue Levothyroxine   Await results   - Assay, Thyroid Stim Hormone  - Free T4, (Free Thyroxine)    6. Depression, unspecified depression type  Stable   Continue Celexa    7. Obstructive sleep apnea  CPAP  Continue with Sleep Med    8. Prostate cancer screening  Await results   - PSA Total, Diagnostic    9. Lumbar pain  The testicular ache is likely a radicular symptoms since both started at the same time.   Defers Prednisone   Gentle stretching at home.  Await lab from above   Can contact me if not better in 2 weeks for MRI at [facility]  - cyclobenzaprine 10 MG Oral Tab; Take 1 tablet (10 mg total) by mouth 3 (three) times daily as needed for Muscle spasms.  Dispense: 30 tablet; Refill: 0      No follow-ups on file.    Orders for this visit:    Orders Placed This Encounter   Procedures    ALT(SGPT)    AST (SGOT)    PSA Total, Diagnostic    Lipid Panel    CBC, Platelet; No Differential    Basic Metabolic Panel (8)    Assay, Thyroid Stim Hormone    Free T4, (Free Thyroxine)       None    Meds & Refills for this Visit:  Requested Prescriptions     Signed Prescriptions Disp Refills    cyclobenzaprine 10 MG Oral Tab 30 tablet 0     Sig: Take 1 tablet (10 mg total) by mouth 3 (three) times daily as needed for Muscle spasms.             Authorized by Sunny Molina M.D.

## 2025-02-21 LAB
ALT: 30 U/L (ref 9–46)
AST: 25 U/L (ref 10–35)
BUN: 12 MG/DL (ref 7–25)
CALCIUM: 9.3 MG/DL (ref 8.6–10.3)
CARBON DIOXIDE: 28 MMOL/L (ref 20–32)
CHLORIDE: 106 MMOL/L (ref 98–110)
CHOL/HDLC RATIO: 3.5 (CALC)
CHOLESTEROL, TOTAL: 159 MG/DL
CREATININE: 0.83 MG/DL (ref 0.7–1.35)
EGFR: 99 ML/MIN/1.73M2
GLUCOSE: 86 MG/DL (ref 65–99)
HDL CHOLESTEROL: 45 MG/DL
HEMATOCRIT: 50 % (ref 38.5–50)
HEMOGLOBIN: 16.5 G/DL (ref 13.2–17.1)
LDL-CHOLESTEROL: 97 MG/DL (CALC)
MCH: 31.8 PG (ref 27–33)
MCHC: 33 G/DL (ref 32–36)
MCV: 96.3 FL (ref 80–100)
MPV: 10.5 FL (ref 7.5–12.5)
NON-HDL CHOLESTEROL: 114 MG/DL (CALC)
PLATELET COUNT: 186 THOUSAND/UL (ref 140–400)
POTASSIUM: 4.3 MMOL/L (ref 3.5–5.3)
PSA, TOTAL: 2.36 NG/ML
RDW: 12.1 % (ref 11–15)
RED BLOOD CELL COUNT: 5.19 MILLION/UL (ref 4.2–5.8)
SODIUM: 141 MMOL/L (ref 135–146)
T4, FREE: 1.3 NG/DL (ref 0.8–1.8)
TRIGLYCERIDES: 79 MG/DL
TSH: 0.27 MIU/L (ref 0.4–4.5)
WHITE BLOOD CELL COUNT: 5.5 THOUSAND/UL (ref 3.8–10.8)

## 2025-02-25 ENCOUNTER — PATIENT MESSAGE (OUTPATIENT)
Dept: FAMILY MEDICINE CLINIC | Facility: CLINIC | Age: 65
End: 2025-02-25

## 2025-02-25 RX ORDER — PREDNISONE 20 MG/1
TABLET ORAL
Qty: 18 TABLET | Refills: 0 | Status: SHIPPED | OUTPATIENT
Start: 2025-02-25 | End: 2025-03-06

## 2025-03-12 PROBLEM — Z86.0102 PERSONAL HISTORY OF HYPERPLASTIC COLON POLYPS: Status: ACTIVE | Noted: 2025-03-12

## 2025-03-12 PROBLEM — D12.0 BENIGN NEOPLASM OF CECUM: Status: ACTIVE | Noted: 2025-03-12

## 2025-03-12 PROBLEM — D12.2 BENIGN NEOPLASM OF ASCENDING COLON: Status: ACTIVE | Noted: 2025-03-12

## 2025-03-12 PROBLEM — K63.5 POLYP OF COLON: Status: ACTIVE | Noted: 2025-03-12

## 2025-03-17 ENCOUNTER — PATIENT MESSAGE (OUTPATIENT)
Dept: FAMILY MEDICINE CLINIC | Facility: CLINIC | Age: 65
End: 2025-03-17

## 2025-03-17 RX ORDER — CITALOPRAM HYDROBROMIDE 40 MG/1
40 TABLET ORAL DAILY
Qty: 90 TABLET | Refills: 3 | Status: SHIPPED | OUTPATIENT
Start: 2025-03-17

## 2025-03-17 RX ORDER — LEVOTHYROXINE SODIUM 112 UG/1
112 TABLET ORAL
Qty: 90 TABLET | Refills: 3 | Status: SHIPPED | OUTPATIENT
Start: 2025-03-17

## 2025-03-17 RX ORDER — ATORVASTATIN CALCIUM 20 MG/1
20 TABLET, FILM COATED ORAL NIGHTLY
Qty: 90 TABLET | Refills: 3 | Status: SHIPPED | OUTPATIENT
Start: 2025-03-17

## 2025-04-14 NOTE — H&P
HPI:     Mendoza Werner is a 65 year old male with a PMH of anxiety, HL, hypothyroid, MJ, IBS, OA, psoriasis.  He presents as a consult with:  1. BPH/LUTS    PCP - Jesse  Prior Urologist - Bean 2/22/20    I take care of his father and MOISES.    He feels well. Appetite and energy are good.    Reports several y h/o LUTS.  Prior BPH/OAB meds: took flomax 2020 which partially helped.    AUA SS is 21/35 with 5 f; 4 u, TJ; 3 n, s; 2 w. Unhappy with LUTS.  Incontinence: none  Penoscrotal: no abnormalities  KHUSHBOO: > 40 g prostate, no nodules or tenderness    UA is negative and PVR is 108 mL    UTI hx: none  Gross hematuria: none  Tobacco hx: none  Kidney stone hx: none  Fam h/o  malignancy: none    80% potency and prefers observation.    PSA 2.36 2/20/25    Drinks ~ 16 oz water, 20 soda with medium yellow urine. I encouraged the pt drink at least 40-60 oz water per day or enough to keep urine clear to pale yellow to help with LUTS.    We discussed both flomax and proscar as options for LUTS and reviewed SEs (including low risk of hypotension with flomax and possible sexual side effects with both medications). He would like to try both.    Will start trey/pro for BPH/LUTS. Increase water intake. Observation for frequency, ED. F/u in 6 mo and consider cysto if urinary symptoms do not improve.    HISTORY:  Past Medical History[1]   Past Surgical History[2]   Family History[3]   Social History: Short Social Hx on File[4]     Medications (Active prior to today's visit):  Current Medications[5]    Allergies:  Allergies[6]      ROS:     A comprehensive 10 point review of systems was completed.  Pertinent positives and negatives noted in the the HPI.    PHYSICAL EXAM:     GENERAL APPEARANCE: well, developed, well nourished, in no acute distress  NEUROLOGIC: nonfocal, alert and oriented  HEAD: normocephalic, atraumatic  EYES: sclera non-icteric  EARS: hearing intact  ORAL CAVITY: mucosa moist  NECK/THYROID: no obvious goiter or  masses  LUNGS: nonlabored breathing  ABDOMEN: soft, no obvious masses or tenderness  SKIN: no obvious rashes    : as noted above    ASSESSMENT/PLAN:   Diagnoses and all orders for this visit:    BPH with obstruction/lower urinary tract symptoms  -     URINALYSIS, AUTO, W/O SCOPE  -     tamsulosin 0.4 MG Oral Cap; Take 1 capsule (0.4 mg total) by mouth every evening.  -     finasteride 5 MG Oral Tab; Take 1 tablet (5 mg total) by mouth daily.    Urinary frequency    Erectile dysfunction, unspecified erectile dysfunction type    - as noted above.    Thanks again for this consult.    Jaime Mendoza MD, FACS  Urologist  [facility]  Office: [phone]              [1]   Past Medical History:   Anxiety    Arthritis    Arthritis of right knee    Arthritis, lumbar spine    Back pain    Belching    Bilateral shoulder region arthritis    Depression    Food intolerance    lactose issues    Frequent urination    GERD (gastroesophageal reflux disease)    From [facility] records    Heart palpitations    not currently being treated    HLD (hyperlipidemia)    Hypothyroid    Irregular bowel habits    change in stools    MJ on CPAP    Pain in joints    Positive BENITA (antinuclear antibody)    Psoriasis    From [facility] records    Psoriatic arthritis (HCC)    Right bundle branch block (RBBB)    Noted on EKG from [facility] note dated 4/27/04    Sleep apnea    Stress    Wears glasses   [2]   Past Surgical History:  Procedure Laterality Date    Colonoscopy      Other surgical history      Retinal detatchment    Vasectomy      From [facility] records   [3]   Family History  Problem Relation Age of Onset    Stroke Mother     Heart Disease Father     Stroke Maternal Grandmother    [4]   Social History  Socioeconomic History    Marital status:    Tobacco Use    Smoking status: Never    Smokeless tobacco: Never   Vaping Use    Vaping status: Never Used   Substance and Sexual Activity    Alcohol use: Yes     Comment: on a social basis     Drug use: No     Social Drivers of Health      Received from [facility]    Housing Stability   [5]   Current Outpatient Medications   Medication Sig Dispense Refill    tamsulosin 0.4 MG Oral Cap Take 1 capsule (0.4 mg total) by mouth every evening. 90 capsule 6    finasteride 5 MG Oral Tab Take 1 tablet (5 mg total) by mouth daily. 90 tablet 6    citalopram (CELEXA) 40 MG Oral Tab Take 1 tablet (40 mg total) by mouth daily. 90 tablet 3    atorvastatin 20 MG Oral Tab Take 1 tablet (20 mg total) by mouth nightly. 90 tablet 3    levothyroxine 112 MCG Oral Tab Take 1 tablet (112 mcg total) by mouth before breakfast. 90 tablet 3    OPZELURA 1.5 % External Cream Apply BID to affected areas      diclofenac (VOLTAREN) 1 % External Gel Take 100 g by mouth As Directed.      Ergocalciferol (VITAMIN D OR) Take  by mouth daily.     [6]   Allergies  Allergen Reactions    Augmentin [Amoxicillin-Pot Clavulanate] RASH    Etodolac DIZZINESS and NAUSEA ONLY     Hearing loss, lightheadedness    Simvastatin MYALGIA     Other reaction(s): Muscle/Joint Ache

## 2025-04-23 ENCOUNTER — OFFICE VISIT (OUTPATIENT)
Dept: SURGERY | Facility: CLINIC | Age: 65
End: 2025-04-23
Payer: COMMERCIAL

## 2025-04-23 DIAGNOSIS — N40.1 BPH WITH OBSTRUCTION/LOWER URINARY TRACT SYMPTOMS: Primary | ICD-10-CM

## 2025-04-23 DIAGNOSIS — R35.0 URINARY FREQUENCY: ICD-10-CM

## 2025-04-23 DIAGNOSIS — N52.9 ERECTILE DYSFUNCTION, UNSPECIFIED ERECTILE DYSFUNCTION TYPE: ICD-10-CM

## 2025-04-23 DIAGNOSIS — N13.8 BPH WITH OBSTRUCTION/LOWER URINARY TRACT SYMPTOMS: Primary | ICD-10-CM

## 2025-04-23 LAB
APPEARANCE: CLEAR
BILIRUBIN: NEGATIVE
GLUCOSE (URINE DIPSTICK): NEGATIVE MG/DL
KETONES (URINE DIPSTICK): NEGATIVE MG/DL
LEUKOCYTES: NEGATIVE
MULTISTIX LOT#: ABNORMAL NUMERIC
NITRITE, URINE: NEGATIVE
PH, URINE: 6 (ref 4.5–8)
PROTEIN (URINE DIPSTICK): NEGATIVE MG/DL
SPECIFIC GRAVITY: 1.01 (ref 1–1.03)
URINE-COLOR: YELLOW
UROBILINOGEN,SEMI-QN: 0.2 MG/DL (ref 0–1.9)

## 2025-04-23 PROCEDURE — G2211 COMPLEX E/M VISIT ADD ON: HCPCS | Performed by: UROLOGY

## 2025-04-23 PROCEDURE — 81003 URINALYSIS AUTO W/O SCOPE: CPT | Performed by: UROLOGY

## 2025-04-23 PROCEDURE — 51798 US URINE CAPACITY MEASURE: CPT | Performed by: UROLOGY

## 2025-04-23 PROCEDURE — 99204 OFFICE O/P NEW MOD 45 MIN: CPT | Performed by: UROLOGY

## 2025-04-23 RX ORDER — FINASTERIDE 5 MG/1
5 TABLET, FILM COATED ORAL DAILY
Qty: 90 TABLET | Refills: 6 | Status: SHIPPED | OUTPATIENT
Start: 2025-04-23

## 2025-04-23 RX ORDER — TAMSULOSIN HYDROCHLORIDE 0.4 MG/1
0.4 CAPSULE ORAL EVERY EVENING
Qty: 90 CAPSULE | Refills: 6 | Status: SHIPPED | OUTPATIENT
Start: 2025-04-23

## 2025-04-23 NOTE — PATIENT INSTRUCTIONS
1. Increase water intake to 40-60 ounces (2 liters) per day or enough to keep urine clear to pale yellow.  2. Start tamsulosin and finasteride

## 2025-06-11 ENCOUNTER — TELEPHONE (OUTPATIENT)
Dept: SURGERY | Facility: CLINIC | Age: 65
End: 2025-06-11

## 2025-06-11 DIAGNOSIS — N40.1 BPH WITH OBSTRUCTION/LOWER URINARY TRACT SYMPTOMS: ICD-10-CM

## 2025-06-11 DIAGNOSIS — N13.8 BPH WITH OBSTRUCTION/LOWER URINARY TRACT SYMPTOMS: ICD-10-CM

## 2025-06-11 RX ORDER — FINASTERIDE 5 MG/1
5 TABLET, FILM COATED ORAL DAILY
Qty: 30 TABLET | Refills: 0 | Status: SHIPPED | OUTPATIENT
Start: 2025-06-11

## 2025-06-11 NOTE — TELEPHONE ENCOUNTER
Per patient calling stating he is out of town and is asking if Dr. Mendoza would be able to send his tamsulosin prescription to a local pharmacy. Per patient is requesting tamsulosin be sent to [facility] [address] [facility]. Please call back.

## 2025-06-30 ENCOUNTER — PATIENT MESSAGE (OUTPATIENT)
Dept: FAMILY MEDICINE CLINIC | Facility: CLINIC | Age: 65
End: 2025-06-30

## 2025-06-30 DIAGNOSIS — E03.9 HYPOTHYROIDISM, UNSPECIFIED TYPE: Primary | ICD-10-CM

## 2025-06-30 DIAGNOSIS — L65.9 HAIR LOSS: ICD-10-CM

## 2025-07-01 LAB
T4, FREE: 1.2 NG/DL (ref 0.8–1.8)
TSH: 0.43 MIU/L (ref 0.4–4.5)

## 2025-07-07 ENCOUNTER — PATIENT MESSAGE (OUTPATIENT)
Dept: SURGERY | Facility: CLINIC | Age: 65
End: 2025-07-07

## (undated) NOTE — LETTER
2020      RE: Johnathan Calderon  : 1960      To Whom it May Concern,      Johnathan Calderon is a patient of mine. He suffers from Obstructive Sleep Apnea  He uses CPAP with great benefit to help with MJ. Sulema Kumar is in need of a replacement mask.

## (undated) NOTE — MR AVS SNAPSHOT
[address] [facility] [id number] [phone]               Thank you for choosing us for your [facility].   We are glad to serve you and happy to provide you with this discharge instructions in [employer] by going to Visits < Admission Summaries. If you've been to the Emergency Department or your doctor's office, you can view your past visit information in [employer] by going to Visits < Visit Summaries. [facility] Access questions?

## (undated) NOTE — LETTER
06/03/19        Soha Werner  [address] [facility]] [id number]      Dear Rossana Spivey records indicate that you have outstanding lab work and or testing that was ordered for you and has not yet been completed:  Lab Frequency Next Occurrence   MRI